# Patient Record
Sex: MALE | Race: WHITE | Employment: OTHER | URBAN - METROPOLITAN AREA
[De-identification: names, ages, dates, MRNs, and addresses within clinical notes are randomized per-mention and may not be internally consistent; named-entity substitution may affect disease eponyms.]

---

## 2017-01-31 ENCOUNTER — APPOINTMENT (OUTPATIENT)
Dept: GENERAL RADIOLOGY | Age: 77
End: 2017-01-31
Payer: MEDICARE

## 2017-01-31 ENCOUNTER — HOSPITAL ENCOUNTER (EMERGENCY)
Age: 77
Discharge: HOME | End: 2017-01-31
Attending: EMERGENCY MEDICINE
Payer: MEDICARE

## 2017-01-31 VITALS
HEIGHT: 72 IN | SYSTOLIC BLOOD PRESSURE: 150 MMHG | BODY MASS INDEX: 29.93 KG/M2 | TEMPERATURE: 97.6 F | RESPIRATION RATE: 18 BRPM | OXYGEN SATURATION: 96 % | HEART RATE: 78 BPM | WEIGHT: 221 LBS | DIASTOLIC BLOOD PRESSURE: 93 MMHG

## 2017-01-31 DIAGNOSIS — R53.1 WEAKNESS: ICD-10-CM

## 2017-01-31 DIAGNOSIS — R06.2 WHEEZING: Primary | ICD-10-CM

## 2017-01-31 LAB
ALBUMIN SERPL BCP-MCNC: 3.4 G/DL (ref 3.2–4.6)
ALBUMIN/GLOB SERPL: 0.9 {RATIO} (ref 1.2–3.5)
ALP SERPL-CCNC: 110 U/L (ref 50–136)
ALT SERPL-CCNC: 30 U/L (ref 12–65)
ANION GAP BLD CALC-SCNC: 9 MMOL/L (ref 7–16)
AST SERPL W P-5'-P-CCNC: 31 U/L (ref 15–37)
ATRIAL RATE: 87 BPM
BASOPHILS # BLD AUTO: 0 K/UL (ref 0–0.2)
BASOPHILS # BLD: 0 % (ref 0–2)
BILIRUB SERPL-MCNC: 0.6 MG/DL (ref 0.2–1.1)
BUN SERPL-MCNC: 17 MG/DL (ref 8–23)
CALCIUM SERPL-MCNC: 8.9 MG/DL (ref 8.3–10.4)
CALCULATED P AXIS, ECG09: 46 DEGREES
CALCULATED R AXIS, ECG10: -7 DEGREES
CALCULATED T AXIS, ECG11: 94 DEGREES
CHLORIDE SERPL-SCNC: 104 MMOL/L (ref 98–107)
CO2 SERPL-SCNC: 29 MMOL/L (ref 21–32)
CREAT SERPL-MCNC: 1.18 MG/DL (ref 0.8–1.5)
DIAGNOSIS, 93000: NORMAL
DIASTOLIC BP, ECG02: NORMAL MMHG
DIFFERENTIAL METHOD BLD: ABNORMAL
EOSINOPHIL # BLD: 0.2 K/UL (ref 0–0.8)
EOSINOPHIL NFR BLD: 2 % (ref 0.5–7.8)
ERYTHROCYTE [DISTWIDTH] IN BLOOD BY AUTOMATED COUNT: 14.2 % (ref 11.9–14.6)
GLOBULIN SER CALC-MCNC: 3.6 G/DL (ref 2.3–3.5)
GLUCOSE SERPL-MCNC: 131 MG/DL (ref 65–100)
HCT VFR BLD AUTO: 41.8 % (ref 41.1–50.3)
HGB BLD-MCNC: 13.9 G/DL (ref 13.6–17.2)
IMM GRANULOCYTES # BLD: 0 K/UL (ref 0–0.5)
IMM GRANULOCYTES NFR BLD AUTO: 0 % (ref 0–5)
LIPASE SERPL-CCNC: 119 U/L (ref 73–393)
LYMPHOCYTES # BLD AUTO: 22 % (ref 13–44)
LYMPHOCYTES # BLD: 2.5 K/UL (ref 0.5–4.6)
MCH RBC QN AUTO: 32.6 PG (ref 26.1–32.9)
MCHC RBC AUTO-ENTMCNC: 33.3 G/DL (ref 31.4–35)
MCV RBC AUTO: 97.9 FL (ref 79.6–97.8)
MONOCYTES # BLD: 1 K/UL (ref 0.1–1.3)
MONOCYTES NFR BLD AUTO: 9 % (ref 4–12)
NEUTS SEG # BLD: 7.8 K/UL (ref 1.7–8.2)
NEUTS SEG NFR BLD AUTO: 67 % (ref 43–78)
P-R INTERVAL, ECG05: 176 MS
PLATELET # BLD AUTO: 251 K/UL (ref 150–450)
PLATELET COMMENTS,PCOM: ADEQUATE
PMV BLD AUTO: 9.5 FL (ref 10.8–14.1)
POTASSIUM SERPL-SCNC: 3 MMOL/L (ref 3.5–5.1)
PROT SERPL-MCNC: 7 G/DL (ref 6.3–8.2)
Q-T INTERVAL, ECG07: 356 MS
QRS DURATION, ECG06: 80 MS
QTC CALCULATION (BEZET), ECG08: 428 MS
RBC # BLD AUTO: 4.27 M/UL (ref 4.23–5.67)
RBC MORPH BLD: ABNORMAL
SODIUM SERPL-SCNC: 142 MMOL/L (ref 136–145)
SYSTOLIC BP, ECG01: NORMAL MMHG
VENTRICULAR RATE, ECG03: 87 BPM
WBC # BLD AUTO: 11.5 K/UL (ref 4.3–11.1)
WBC MORPH BLD: ABNORMAL

## 2017-01-31 PROCEDURE — 77030013140 HC MSK NEB VYRM -A

## 2017-01-31 PROCEDURE — 74011250636 HC RX REV CODE- 250/636: Performed by: EMERGENCY MEDICINE

## 2017-01-31 PROCEDURE — 74011250637 HC RX REV CODE- 250/637: Performed by: EMERGENCY MEDICINE

## 2017-01-31 PROCEDURE — 83690 ASSAY OF LIPASE: CPT | Performed by: EMERGENCY MEDICINE

## 2017-01-31 PROCEDURE — 99284 EMERGENCY DEPT VISIT MOD MDM: CPT | Performed by: EMERGENCY MEDICINE

## 2017-01-31 PROCEDURE — 80053 COMPREHEN METABOLIC PANEL: CPT

## 2017-01-31 PROCEDURE — 74011000250 HC RX REV CODE- 250: Performed by: EMERGENCY MEDICINE

## 2017-01-31 PROCEDURE — 93005 ELECTROCARDIOGRAM TRACING: CPT

## 2017-01-31 PROCEDURE — 96374 THER/PROPH/DIAG INJ IV PUSH: CPT | Performed by: EMERGENCY MEDICINE

## 2017-01-31 PROCEDURE — 94640 AIRWAY INHALATION TREATMENT: CPT

## 2017-01-31 PROCEDURE — 85025 COMPLETE CBC W/AUTO DIFF WBC: CPT

## 2017-01-31 PROCEDURE — 71020 XR CHEST PA LAT: CPT

## 2017-01-31 RX ORDER — ALBUTEROL SULFATE 0.83 MG/ML
5 SOLUTION RESPIRATORY (INHALATION)
Status: COMPLETED | OUTPATIENT
Start: 2017-01-31 | End: 2017-01-31

## 2017-01-31 RX ORDER — SODIUM CHLORIDE 0.9 % (FLUSH) 0.9 %
5-10 SYRINGE (ML) INJECTION EVERY 8 HOURS
Status: DISCONTINUED | OUTPATIENT
Start: 2017-01-31 | End: 2017-01-31 | Stop reason: HOSPADM

## 2017-01-31 RX ORDER — PREDNISONE 50 MG/1
50 TABLET ORAL DAILY
Qty: 5 TAB | Refills: 0 | Status: SHIPPED | OUTPATIENT
Start: 2017-01-31 | End: 2017-02-05

## 2017-01-31 RX ORDER — POTASSIUM CHLORIDE 20 MEQ/1
40 TABLET, EXTENDED RELEASE ORAL
Status: COMPLETED | OUTPATIENT
Start: 2017-01-31 | End: 2017-01-31

## 2017-01-31 RX ORDER — ALBUTEROL SULFATE 0.83 MG/ML
2.5 SOLUTION RESPIRATORY (INHALATION)
Qty: 1 PACKAGE | Refills: 2 | Status: SHIPPED | OUTPATIENT
Start: 2017-01-31 | End: 2018-02-16

## 2017-01-31 RX ORDER — SODIUM CHLORIDE 0.9 % (FLUSH) 0.9 %
5-10 SYRINGE (ML) INJECTION AS NEEDED
Status: DISCONTINUED | OUTPATIENT
Start: 2017-01-31 | End: 2017-01-31 | Stop reason: HOSPADM

## 2017-01-31 RX ADMIN — POTASSIUM CHLORIDE 40 MEQ: 20 TABLET, EXTENDED RELEASE ORAL at 12:33

## 2017-01-31 RX ADMIN — ALBUTEROL SULFATE 5 MG: 2.5 SOLUTION RESPIRATORY (INHALATION) at 13:20

## 2017-01-31 RX ADMIN — METHYLPREDNISOLONE SODIUM SUCCINATE 125 MG: 125 INJECTION, POWDER, FOR SOLUTION INTRAMUSCULAR; INTRAVENOUS at 12:33

## 2017-01-31 NOTE — PROGRESS NOTES
Information faxed to [employer] for Nebulizer. They state that they will fedex the nebulizer to the patient.

## 2017-01-31 NOTE — ED PROVIDER NOTES
HPI Comments: Patient is a 59-year-old male who is coming in with a pain underneath both ribs particularly when he bends over. He has had a little bit of shortness of breath with it as well as some generalized weakness. Trinity James He states it's been there for about 3 weeks. Patient has had some wheezing and cough diffusely. Patient is a 67 y.o. male presenting with shortness of breath. The history is provided by the patient. Shortness of Breath   Pertinent negatives include no fever, no cough, no wheezing, no chest pain, no vomiting and no abdominal pain. Past Medical History:   Diagnosis Date    Arthritis      generalized osteo.  CAD (coronary artery disease)     Claustrophobia     Ill-defined condition      orthostatic hypotension    Sarcoidosis of lymph nodes (HCC)      in remission    Seizures (HCC)     Shortness of breath     Syncope and collapse     Vertigo      occassional       Past Surgical History:   Procedure Laterality Date    Hx lymph node dissection       Sarcoid in remission    Hx shoulder arthroscopy       right side     Hx knee arthroscopy       left knee    Hx lap cholecystectomy      Hx cataract removal Bilateral     Hx colonoscopy           Family History:   Problem Relation Age of Onset    Heart Disease Mother     Cancer Father     Stroke Brother     Coronary Artery Disease Other      Family History       Social History     Social History    Marital status:      Spouse name: N/A    Number of children: N/A    Years of education: N/A     Occupational History    Not on file. Social History Main Topics    Smoking status: Former Smoker    Smokeless tobacco: Former User      Comment: quit over 20 years ago    Alcohol use No    Drug use: No    Sexual activity: Not on file     Other Topics Concern    Not on file     Social History Narrative         ALLERGIES: Bactrim [sulfamethoprim ds]; Keppra [levetiracetam];  Levaquin [levofloxacin]; and Other medication    Review of Systems   Constitutional: Negative for chills and fever. Respiratory: Positive for shortness of breath. Negative for cough, chest tightness, wheezing and stridor. Cardiovascular: Negative for chest pain and palpitations. Gastrointestinal: Negative for abdominal pain, nausea and vomiting. Skin: Negative. All other systems reviewed and are negative. Vitals:    01/31/17 1125   BP: 128/83   Pulse: 86   Resp: 18   Temp: 97.5 °F (36.4 °C)   SpO2: 94%   Weight: 100.2 kg (221 lb)   Height: 6' (1.829 m)            Physical Exam   Constitutional: He is oriented to person, place, and time. He appears well-developed and well-nourished. No distress. HENT:   Head: Normocephalic and atraumatic. Eyes: Conjunctivae are normal. No scleral icterus. Neck: Normal range of motion. Neck supple. Cardiovascular: Normal rate, regular rhythm and normal heart sounds. Pulmonary/Chest: Effort normal. No stridor. No respiratory distress. He has wheezes (bilateral lung wheezing). He has no rales. He exhibits no tenderness. Abdominal: Soft. There is tenderness (mild tenderness in the epigastric region diffusely. ). There is no rebound and no guarding. Neurological: He is alert and oriented to person, place, and time. No focal weakness   Skin: Skin is warm and dry. No rash noted. He is not diaphoretic. Psychiatric: He has a normal mood and affect. His behavior is normal.   Nursing note and vitals reviewed. MDM  Number of Diagnoses or Management Options  Weakness:   Wheezing:   Diagnosis management comments: Patient's breathing has improved following treatments. His family states he has a hard time taking his inhaler at home even when using a spacer I have spoken with  and we are trying to arrange for a home nebulizer.   I will also put him on steroid prescription of prednisone burst.    Dung Bruce MD; 1/31/2017 @1:54 PM Voice dictation software was used during the making of this note. This software is not perfect and grammatical and other typographical errors may be present.   This note has not been proofread for errors.  ===================================================================        Amount and/or Complexity of Data Reviewed  Clinical lab tests: ordered and reviewed (Results for orders placed or performed during the hospital encounter of 01/31/17  -CBC WITH AUTOMATED DIFF       Result                                            Value                         Ref Range                       WBC                                               11.5 (H)                      4.3 - 11.1 K/uL                 RBC                                               4.27                          4.23 - 5.67 M/uL                HGB                                               13.9                          13.6 - 17.2 g/dL                HCT                                               41.8                          41.1 - 50.3 %                   MCV                                               97.9 (H)                      79.6 - 97.8 FL                  MCH                                               32.6                          26.1 - 32.9 PG                  MCHC                                              33.3                          31.4 - 35.0 g/dL                RDW                                               14.2                          11.9 - 14.6 %                   PLATELET                                          251                           150 - 450 K/uL                  MPV                                               9.5 (L)                       10.8 - 14.1 FL                  NEUTROPHILS                                       67                            43 - 78 %                       LYMPHOCYTES                                       22                            13 - 44 %                       MONOCYTES                                         9 4.0 - 12.0 %                    EOSINOPHILS                                       2                             0.5 - 7.8 %                     BASOPHILS                                         0                             0.0 - 2.0 %                     IMMATURE GRANULOCYTES                             0                             0.0 - 5.0 %                     ABS. NEUTROPHILS                                  7.8                           1.7 - 8.2 K/UL                  ABS. LYMPHOCYTES                                  2.5                           0.5 - 4.6 K/UL                  ABS. MONOCYTES                                    1.0                           0.1 - 1.3 K/UL                  ABS. EOSINOPHILS                                  0.2                           0.0 - 0.8 K/UL                  ABS. BASOPHILS                                    0.0                           0.0 - 0.2 K/UL                  ABS. IMM.  GRANS.                                  0.0                           0.0 - 0.5 K/UL                  RBC COMMENTS                                      NORMOCYTIC/NORMOCHROMIC                                       WBC COMMENTS                                      Result Confirmed By Smear                                     PLATELET COMMENTS                                 ADEQUATE                                                      DF                                                AUTOMATED                                                -METABOLIC PANEL, COMPREHENSIVE       Result                                            Value                         Ref Range                       Sodium                                            142                           136 - 145 mmol/L                Potassium                                         3.0 (L)                       3.5 - 5.1 mmol/L                Chloride                                          104 98 - 107 mmol/L                 CO2                                               29                            21 - 32 mmol/L                  Anion gap                                         9                             7 - 16 mmol/L                   Glucose                                           131 (H)                       65 - 100 mg/dL                  BUN                                               17                            8 - 23 MG/DL                    Creatinine                                        1.18                          0.8 - 1.5 MG/DL                 GFR est AA                                        >60                           >60 ml/min/1.73m2               GFR est non-AA                                    >60                           >60 ml/min/1.73m2               Calcium                                           8.9                           8.3 - 10.4 MG/DL                Bilirubin, total                                  0.6                           0.2 - 1.1 MG/DL                 ALT                                               30                            12 - 65 U/L                     AST                                               31                            15 - 37 U/L                     Alk. phosphatase                                  110                           50 - 136 U/L                    Protein, total                                    7.0                           6.3 - 8.2 g/dL                  Albumin                                           3.4                           3.2 - 4.6 g/dL                  Globulin                                          3.6 (H)                       2.3 - 3.5 g/dL                  A-G Ratio                                         0.9 (L)                       1.2 - 3.5                  -LIPASE       Result                                            Value                         Ref Range Lipase                                            119                           73 - 393 U/L               -EKG, 12 LEAD, INITIAL       Result                                            Value                         Ref Range                       Systolic BP                                                                     mmHg                            Diastolic BP                                                                    mmHg                            Ventricular Rate                                  87                            BPM                             Atrial Rate                                       87                            BPM                             P-R Interval                                      176                           ms                              QRS Duration                                      80                            ms                              Q-T Interval                                      356                           ms                              QTC Calculation (Bezet)                           428                           ms                              Calculated P Axis                                 46                            degrees                         Calculated R Axis                                 -7                            degrees                         Calculated T Axis                                 94                            degrees                         Diagnosis                                                                                                   !!! Poor data quality, interpretation may be adversely affected   Sinus rhythm with Premature atrial complexes   Nonspecific T wave abnormality   Abnormal ECG   When compared with ECG of 16-JUN-2016 08:43,   Premature atrial complexes are now Present   QRS duration has decreased   T wave inversion more evident in Lateral leads    )  Tests in the radiology section of CPT®: ordered and reviewed (Xr Chest Pa Lat    Result Date: 1/31/2017  Chest 2 views dated 1/31/2017 CLINICAL INFORMATION: Shortness of breath. History of sarcoidosis Heart is mildly enlarged and mediastinum unremarkable. Pulmonary vascularity appears normal and lungs are clear. No pleural effusion.      IMPRESSION: No acute abnormality   )      ED Course       Procedures

## 2017-01-31 NOTE — DISCHARGE INSTRUCTIONS
Wheezing or Bronchoconstriction: Care Instructions  Your Care Instructions  Wheezing is a whistling noise made during breathing. It occurs when the small airways, or bronchial tubes, that lead to your lungs swell or contract (spasm) and become narrow. This narrowing is called bronchoconstriction. When your airways constrict, it is hard for air to pass through and this makes it hard for you to breathe. Wheezing and bronchoconstriction can be caused by many problems, including:  · An infection such as the flu or a cold. · Allergies such as hay fever. · Diseases such as asthma or chronic obstructive pulmonary disease. · Smoking. Treatment for your wheezing depends on what is causing the problem. Your wheezing may get better without treatment. But you may need to pay attention to things that cause your wheezing and avoid them. Or you may need medicine to help treat the wheezing and to reduce the swelling or to relieve spasms in your lungs. Follow-up care is a key part of your treatment and safety. Be sure to make and go to all appointments, and call your doctor if you are having problems. It is also a good idea to know your test results and keep a list of the medicines you take. How can you care for yourself at home? · Take your medicine exactly as prescribed. Call your doctor if you think you are having a problem with your medicine. You will get more details on the specific medicine your doctor prescribes. · If your doctor prescribed antibiotics, take them as directed. Do not stop taking them just because you feel better. You need to take the full course of antibiotics. · Breathe moist air from a humidifier, hot shower, or sink filled with hot water. This may help ease your symptoms and make it easier for you to breathe. · If you have congestion in your nose and throat, drinking plenty of fluids, especially hot fluids, may help relieve your symptoms.  If you have kidney, heart, or liver disease and have to limit fluids, talk with your doctor before you increase the amount of fluids you drink. · If you have mucus in your airways, it may help to breathe deeply and cough. · Do not smoke or allow others to smoke around you. Smoking can make your wheezing worse. If you need help quitting, talk to your doctor about stop-smoking programs and medicines. These can increase your chances of quitting for good. · Avoid things that may cause your wheezing. These may include colds, smoke, air pollution, dust, pollen, pets, cockroaches, stress, and cold air. When should you call for help? Call 911 anytime you think you may need emergency care. For example, call if:  · You have severe trouble breathing. · You passed out (lost consciousness). Call your doctor now or seek immediate medical care if:  · You cough up yellow, dark brown, or bloody mucus (sputum). · You have new or worse shortness of breath. · Your wheezing is not getting better or it gets worse after you start taking your medicine. Watch closely for changes in your health, and be sure to contact your doctor if:  · You do not get better as expected. Where can you learn more? Go to http://nicol-shanae.info/. Enter 454 [id number] in the search box to learn more about \"Wheezing or Bronchoconstriction: Care Instructions. \"  Current as of: May 23, 2016  Content Version: 11.1  © 5961-1511 [employer]. Care instructions adapted under license by [employer] (which disclaims liability or warranty for this information). If you have questions about a medical condition or this instruction, always ask your healthcare professional. [place] [address] any warranty or liability for your use of this information. Weakness: Care Instructions  Your Care Instructions  Weakness is a lack of physical or muscle strength. You may feel that you need to make extra effort to move your arms, legs, or other muscles.  Generalized weakness means that you feel weak in most areas of your body. Another type of weakness may affect just one muscle or group of muscles. You may feel weak and tired after you have done too much activity, such as taking an extra-long hike. This is not a serious problem. It often goes away on its own. Feeling weak can also be caused by medical conditions like thyroid problems, depression, or a virus. Sometimes the cause can be serious. Your doctor may want to do more tests to try to find the cause of the weakness. The doctor has checked you carefully, but problems can develop later. If you notice any problems or new symptoms, get medical treatment right away. Follow-up care is a key part of your treatment and safety. Be sure to make and go to all appointments, and call your doctor if you are having problems. It's also a good idea to know your test results and keep a list of the medicines you take. How can you care for yourself at home? · Rest when you feel tired. · Be safe with medicines. If your doctor prescribed medicine, take it exactly as prescribed. Call your doctor if you think you are having a problem with your medicine. You will get more details on the specific medicines your doctor prescribes. · Do not skip meals. Eating a balanced diet may increase your energy level. · Get some physical activity every day, but do not get too tired. When should you call for help? Call your doctor now or seek immediate medical care if:  · You have new or worse weakness. · You are dizzy or lightheaded, or you feel like you may faint. Watch closely for changes in your health, and be sure to contact your doctor if:  · You do not get better as expected. Where can you learn more? Go to http://nicol-shanae.info/. Enter 606 [id number] [id number] in the search box to learn more about \"Weakness: Care Instructions. \"  Current as of: May 27, 2016  Content Version: 11.1  © 9081-5541 [employer].  Care instructions adapted under license by [employer] (which disclaims liability or warranty for this information). If you have questions about a medical condition or this instruction, always ask your healthcare professional. [address] any warranty or liability for your use of this information.

## 2017-08-16 PROBLEM — M54.50 CHRONIC LOW BACK PAIN: Status: ACTIVE | Noted: 2017-08-16

## 2017-08-16 PROBLEM — G89.29 CHRONIC LOW BACK PAIN: Status: ACTIVE | Noted: 2017-08-16

## 2017-08-16 PROBLEM — Z91.09 ENVIRONMENTAL ALLERGIES: Status: ACTIVE | Noted: 2017-08-16

## 2017-10-20 PROBLEM — Z95.820 S/P ANGIOPLASTY WITH STENT: Status: ACTIVE | Noted: 2017-10-20

## 2017-10-20 PROBLEM — I35.8 AORTIC VALVE SCLEROSIS: Status: ACTIVE | Noted: 2017-10-20

## 2018-10-01 ENCOUNTER — HOSPITAL ENCOUNTER (EMERGENCY)
Age: 78
Discharge: HOME | End: 2018-10-02
Attending: EMERGENCY MEDICINE
Payer: MEDICARE

## 2018-10-01 DIAGNOSIS — R19.7 DIARRHEA, UNSPECIFIED TYPE: Primary | ICD-10-CM

## 2018-10-01 DIAGNOSIS — R10.84 ABDOMINAL PAIN, GENERALIZED: ICD-10-CM

## 2018-10-01 DIAGNOSIS — R63.4 WEIGHT LOSS: ICD-10-CM

## 2018-10-01 LAB
ALBUMIN SERPL-MCNC: 3.8 G/DL (ref 3.2–4.6)
ALBUMIN/GLOB SERPL: 1.2 {RATIO} (ref 1.2–3.5)
ALP SERPL-CCNC: 116 U/L (ref 50–136)
ALT SERPL-CCNC: 18 U/L (ref 12–65)
ANION GAP SERPL CALC-SCNC: 7 MMOL/L (ref 7–16)
AST SERPL-CCNC: 23 U/L (ref 15–37)
BASOPHILS # BLD: 0 K/UL (ref 0–0.2)
BASOPHILS NFR BLD: 0 % (ref 0–2)
BILIRUB SERPL-MCNC: 0.5 MG/DL (ref 0.2–1.1)
BUN SERPL-MCNC: 17 MG/DL (ref 8–23)
CALCIUM SERPL-MCNC: 9.2 MG/DL (ref 8.3–10.4)
CHLORIDE SERPL-SCNC: 105 MMOL/L (ref 98–107)
CO2 SERPL-SCNC: 31 MMOL/L (ref 21–32)
CREAT SERPL-MCNC: 0.98 MG/DL (ref 0.8–1.5)
DIFFERENTIAL METHOD BLD: ABNORMAL
EOSINOPHIL # BLD: 0.1 K/UL (ref 0–0.8)
EOSINOPHIL NFR BLD: 2 % (ref 0.5–7.8)
ERYTHROCYTE [DISTWIDTH] IN BLOOD BY AUTOMATED COUNT: 13.3 %
GLOBULIN SER CALC-MCNC: 3.3 G/DL (ref 2.3–3.5)
GLUCOSE SERPL-MCNC: 104 MG/DL (ref 65–100)
HCT VFR BLD AUTO: 41.6 % (ref 41.1–50.3)
HGB BLD-MCNC: 13.7 G/DL (ref 13.6–17.2)
IMM GRANULOCYTES # BLD: 0 K/UL (ref 0–0.5)
IMM GRANULOCYTES NFR BLD AUTO: 0 % (ref 0–5)
LIPASE SERPL-CCNC: 138 U/L (ref 73–393)
LYMPHOCYTES # BLD: 1.9 K/UL (ref 0.5–4.6)
LYMPHOCYTES NFR BLD: 24 % (ref 13–44)
MCH RBC QN AUTO: 32.6 PG (ref 26.1–32.9)
MCHC RBC AUTO-ENTMCNC: 32.9 G/DL (ref 31.4–35)
MCV RBC AUTO: 99 FL (ref 79.6–97.8)
MONOCYTES # BLD: 0.9 K/UL (ref 0.1–1.3)
MONOCYTES NFR BLD: 11 % (ref 4–12)
NEUTS SEG # BLD: 5.2 K/UL (ref 1.7–8.2)
NEUTS SEG NFR BLD: 64 % (ref 43–78)
NRBC # BLD: 0 K/UL (ref 0–0.2)
PLATELET # BLD AUTO: 218 K/UL (ref 150–450)
PMV BLD AUTO: 10 FL (ref 9.4–12.3)
POTASSIUM SERPL-SCNC: 3.2 MMOL/L (ref 3.5–5.1)
PROT SERPL-MCNC: 7.1 G/DL (ref 6.3–8.2)
RBC # BLD AUTO: 4.2 M/UL (ref 4.23–5.6)
SODIUM SERPL-SCNC: 143 MMOL/L (ref 136–145)
WBC # BLD AUTO: 8.1 K/UL (ref 4.3–11.1)

## 2018-10-01 PROCEDURE — 83690 ASSAY OF LIPASE: CPT

## 2018-10-01 PROCEDURE — 99284 EMERGENCY DEPT VISIT MOD MDM: CPT | Performed by: EMERGENCY MEDICINE

## 2018-10-01 PROCEDURE — 85025 COMPLETE CBC W/AUTO DIFF WBC: CPT

## 2018-10-01 PROCEDURE — 80053 COMPREHEN METABOLIC PANEL: CPT

## 2018-10-01 RX ORDER — POTASSIUM CHLORIDE 20 MEQ/1
40 TABLET, EXTENDED RELEASE ORAL
Status: COMPLETED | OUTPATIENT
Start: 2018-10-01 | End: 2018-10-02

## 2018-10-02 ENCOUNTER — APPOINTMENT (OUTPATIENT)
Dept: CT IMAGING | Age: 78
End: 2018-10-02
Attending: EMERGENCY MEDICINE
Payer: MEDICARE

## 2018-10-02 VITALS
TEMPERATURE: 97.7 F | RESPIRATION RATE: 18 BRPM | BODY MASS INDEX: 23.03 KG/M2 | SYSTOLIC BLOOD PRESSURE: 135 MMHG | HEART RATE: 70 BPM | DIASTOLIC BLOOD PRESSURE: 85 MMHG | OXYGEN SATURATION: 96 % | WEIGHT: 170 LBS | HEIGHT: 72 IN

## 2018-10-02 LAB
BACTERIA URNS QL MICRO: 0 /HPF
CASTS URNS QL MICRO: NORMAL /LPF
EPI CELLS #/AREA URNS HPF: NORMAL /HPF
RBC #/AREA URNS HPF: NORMAL /HPF
WBC URNS QL MICRO: NORMAL /HPF

## 2018-10-02 PROCEDURE — 74011250637 HC RX REV CODE- 250/637: Performed by: EMERGENCY MEDICINE

## 2018-10-02 PROCEDURE — 74011636320 HC RX REV CODE- 636/320: Performed by: EMERGENCY MEDICINE

## 2018-10-02 PROCEDURE — 74177 CT ABD & PELVIS W/CONTRAST: CPT

## 2018-10-02 PROCEDURE — 81003 URINALYSIS AUTO W/O SCOPE: CPT | Performed by: EMERGENCY MEDICINE

## 2018-10-02 PROCEDURE — 74011000258 HC RX REV CODE- 258: Performed by: EMERGENCY MEDICINE

## 2018-10-02 PROCEDURE — 81015 MICROSCOPIC EXAM OF URINE: CPT

## 2018-10-02 RX ORDER — LOPERAMIDE HYDROCHLORIDE 2 MG/1
2 CAPSULE ORAL
Qty: 15 CAP | Refills: 0 | Status: SHIPPED | OUTPATIENT
Start: 2018-10-02 | End: 2018-10-12

## 2018-10-02 RX ORDER — SODIUM CHLORIDE 0.9 % (FLUSH) 0.9 %
10 SYRINGE (ML) INJECTION
Status: COMPLETED | OUTPATIENT
Start: 2018-10-02 | End: 2018-10-02

## 2018-10-02 RX ADMIN — IOPAMIDOL 100 ML: 755 INJECTION, SOLUTION INTRAVENOUS at 01:39

## 2018-10-02 RX ADMIN — Medication 10 ML: at 01:39

## 2018-10-02 RX ADMIN — POTASSIUM CHLORIDE 40 MEQ: 20 TABLET, EXTENDED RELEASE ORAL at 00:27

## 2018-10-02 RX ADMIN — SODIUM CHLORIDE 100 ML: 900 INJECTION, SOLUTION INTRAVENOUS at 01:39

## 2018-10-02 RX ADMIN — DIATRIZOATE MEGLUMINE AND DIATRIZOATE SODIUM 15 ML: 600; 100 SOLUTION ORAL; RECTAL at 00:24

## 2018-10-02 NOTE — ED TRIAGE NOTES
Pt came to the ed c/o nausea, vomitting, and diarrhea x6-8 months. Pt has a gi doc apt, and scheduled for EGD for the 30th of October. Pt is taking carafate until this. pts daughter concerned for dehydration and weakness. Denies cp or sob at this time.

## 2018-10-02 NOTE — ED PROVIDER NOTES
HPI Comments: 76 yo male who had a large bowel movement today that scared him. He has been having a lot of diarrhea for several months now. No blood in it. He does have some mild lower abdominal pain. No fevers or chills. His daughter is concerned about him becoming dehydrated. He has had cholecystectomy several months ago. Family also report a 30 pound weight loss over the last 6 months. He has been seeing his PCP for this. He has had colonoscopy within the last year and is scheduled for an EGD on October 30. Patient is a 77 y.o. male presenting with lethargy. The history is provided by the patient. Lethargy Associated symptoms include abdominal pain. Pertinent negatives include no shortness of breath. Past Medical History:  
Diagnosis Date  Arthritis   
 generalized osteo.  CAD (coronary artery disease)  Claustrophobia  Ill-defined condition   
 orthostatic hypotension  Sarcoidosis of lymph nodes   
 in remission  Seizures ([address]  Shortness of breath  Syncope and collapse  Vertigo   
 occassional  
 
 
Past Surgical History:  
Procedure Laterality Date  HX CATARACT REMOVAL Bilateral   
 HX COLONOSCOPY    
 HX KNEE ARTHROSCOPY    
 left knee  HX LAP CHOLECYSTECTOMY  HX LYMPH NODE DISSECTION Sarcoid in remission  HX SHOULDER ARTHROSCOPY    
 right side  HX UROLOGICAL  2015 TURP Family History:  
Problem Relation Age of Onset  Heart Disease Mother  Cancer Father  Stroke Brother  Coronary Artery Disease Other Family History Social History Social History  Marital status:  Spouse name: N/A  
 Number of children: N/A  
 Years of education: N/A Occupational History  Not on file. Social History Main Topics  Smoking status: Former Smoker  Smokeless tobacco: Former User Comment: quit over 20 years ago  Alcohol use No  
 Drug use:  No  
  Sexual activity: Not on file Other Topics Concern  Not on file Social History Narrative ALLERGIES: Bactrim [sulfamethoprim ds]; Keppra [levetiracetam]; Levaquin [levofloxacin]; and Other medication Review of Systems Constitutional: Negative for chills and fever. Respiratory: Negative for chest tightness, shortness of breath, wheezing and stridor. Gastrointestinal: Positive for abdominal pain, diarrhea, nausea and vomiting. Musculoskeletal: Negative for neck pain and neck stiffness. Skin: Negative. All other systems reviewed and are negative. Vitals:  
 10/01/18 2058 BP: (!) 145/91 Pulse: 70 Resp: 18 Temp: 97.7 °F (36.5 °C) SpO2: 95% Weight: 77.1 kg (170 lb) Height: 6' (1.829 m) Physical Exam  
Constitutional: He is oriented to person, place, and time. He appears well-developed and well-nourished. No distress. HENT:  
Head: Normocephalic and atraumatic. Eyes: Conjunctivae are normal. Right eye exhibits no discharge. Left eye exhibits no discharge. Neck: Neck supple. Pulmonary/Chest: Effort normal. No stridor. No respiratory distress. Abdominal: Soft. Bowel sounds are normal. He exhibits no distension and no mass. There is no tenderness. There is no rebound and no guarding. Neurological: He is alert and oriented to person, place, and time. No focal weakness speech normal  
Skin: Skin is warm and dry. Psychiatric: He has a normal mood and affect. His behavior is normal.  
Nursing note and vitals reviewed. MDM Number of Diagnoses or Management Options Diagnosis management comments: Patient has benign abdomen. He has not had his abdomen imaged in with the weight loss and discomfort had discussed doing this and family wished to proceed with this. To rule out any  Tumors or other pathology. Disposition will depend on this and patient will likely be discharged to follow up with GI. Steve Murdock MD; 10/2/2018 @12:03 AM Voice dictation software was used during the making of this note. This software is not perfect and grammatical and other typographical errors may be present. This note has not been proofread for errors. 
=================================================================== Amount and/or Complexity of Data Reviewed Clinical lab tests: ordered and reviewed (Results for orders placed or performed during the hospital encounter of 10/01/18 
-CBC WITH AUTOMATED DIFF Result                                            Value                         Ref Range WBC                                               8.1                           4.3 - 11.1 K/uL           
     RBC                                               4.20 (L)                      4.23 - 5.6 M/uL HGB                                               13.7                          13.6 - 17.2 g/dL HCT                                               41.6                          41.1 - 50.3 % MCV                                               99.0 (H)                      79.6 - 97.8 FL            
     MCH                                               32.6                          26.1 - 32.9 PG            
     MCHC                                              32.9                          31.4 - 35.0 g/dL RDW                                               13.3                          % PLATELET                                          218                           150 - 450 K/uL MPV                                               10.0                          9.4 - 12.3 FL      ABSOLUTE NRBC                                     0.00                          0.0 - 0.2 K/uL            
     DF                                                AUTOMATED NEUTROPHILS                                       64                            43 - 78 % LYMPHOCYTES                                       24                            13 - 44 % MONOCYTES                                         11                            4.0 - 12.0 % EOSINOPHILS                                       2                             0.5 - 7.8 % BASOPHILS                                         0                             0.0 - 2.0 % IMMATURE GRANULOCYTES                             0                             0.0 - 5.0 %               
     ABS. NEUTROPHILS                                  5.2                           1.7 - 8.2 K/UL            
     ABS. LYMPHOCYTES                                  1.9                           0.5 - 4.6 K/UL            
     ABS. MONOCYTES                                    0.9                           0.1 - 1.3 K/UL            
     ABS. EOSINOPHILS                                  0.1                           0.0 - 0.8 K/UL            
     ABS. BASOPHILS                                    0.0                           0.0 - 0.2 K/UL            
     ABS. IMM. GRANS.                                  0.0                           0.0 - 0.5 K/UL            
-METABOLIC PANEL, COMPREHENSIVE Result                                            Value                         Ref Range Sodium                                            143                           136 - 145 mmol/L Potassium                                         3.2 (L)                       3.5 - 5.1 mmol/L      Chloride                                          105                           98 - 107 mmol/L           
     CO2                                               31 21 - 32 mmol/L Anion gap                                         7                             7 - 16 mmol/L Glucose                                           104 (H)                       65 - 100 mg/dL BUN                                               17                            8 - 23 MG/DL Creatinine                                        0.98                          0.8 - 1.5 MG/DL           
     GFR est AA                                        >60                           >60 ml/min/1.73m2 GFR est non-AA                                    >60                           >60 ml/min/1.73m2 Calcium                                           9.2                           8.3 - 10.4 MG/DL Bilirubin, total                                  0.5                           0.2 - 1.1 MG/DL           
     ALT (SGPT)                                        18                            12 - 65 U/L               
     AST (SGOT)                                        23                            15 - 37 U/L Alk. phosphatase                                  116                           50 - 136 U/L Protein, total                                    7.1                           6.3 - 8.2 g/dL Albumin                                           3.8                           3.2 - 4.6 g/dL Globulin                                          3.3                           2.3 - 3.5 g/dL A-G Ratio                                         1.2                           1.2 - 3.5                 
-LIPASE Result                                            Value                         Ref Range      Lipase                                            138 73 - 393 U/L              ) 
 
 
 
 
ED Course Procedures

## 2018-10-02 NOTE — DISCHARGE INSTRUCTIONS
Diarrhea: Care Instructions  Your Care Instructions    Diarrhea is loose, watery stools (bowel movements). The exact cause is often hard to find. Sometimes diarrhea is your body's way of getting rid of what caused an upset stomach. Viruses, food poisoning, and many medicines can cause diarrhea. Some people get diarrhea in response to emotional stress, anxiety, or certain foods. Almost everyone has diarrhea now and then. It usually isn't serious, and your stools will return to normal soon. The important thing to do is replace the fluids you have lost, so you can prevent dehydration. The doctor has checked you carefully, but problems can develop later. If you notice any problems or new symptoms, get medical treatment right away. Follow-up care is a key part of your treatment and safety. Be sure to make and go to all appointments, and call your doctor if you are having problems. It's also a good idea to know your test results and keep a list of the medicines you take. How can you care for yourself at home? · Watch for signs of dehydration, which means your body has lost too much water. Dehydration is a serious condition and should be treated right away. Signs of dehydration are:  ¨ Increasing thirst and dry eyes and mouth. ¨ Feeling faint or lightheaded. ¨ Darker urine, and a smaller amount of urine than normal.  · To prevent dehydration, drink plenty of fluids, enough so that your urine is light yellow or clear like water. Choose water and other caffeine-free clear liquids until you feel better. If you have kidney, heart, or liver disease and have to limit fluids, talk with your doctor before you increase the amount of fluids you drink. · Begin eating small amounts of mild foods the next day, if you feel like it. ¨ Try yogurt that has live cultures of Lactobacillus. (Check the label.)  ¨ Avoid spicy foods, fruits, alcohol, and caffeine until 48 hours after all symptoms are gone.   ¨ Avoid chewing gum that contains sorbitol. ¨ Avoid dairy products (except for yogurt with Lactobacillus) while you have diarrhea and for 3 days after symptoms are gone. · The doctor may recommend that you take over-the-counter medicine, such as loperamide (Imodium), if you still have diarrhea after 6 hours. Read and follow all instructions on the label. Do not use this medicine if you have bloody diarrhea, a high fever, or other signs of serious illness. Call your doctor if you think you are having a problem with your medicine. When should you call for help? Call 911 anytime you think you may need emergency care. For example, call if:    · You passed out (lost consciousness).     · Your stools are maroon or very bloody.    Call your doctor now or seek immediate medical care if:    · You are dizzy or lightheaded, or you feel like you may faint.     · Your stools are black and look like tar, or they have streaks of blood.     · You have new or worse belly pain.     · You have symptoms of dehydration, such as:  ¨ Dry eyes and a dry mouth. ¨ Passing only a little dark urine. ¨ Feeling thirstier than usual.     · You have a new or higher fever.    Watch closely for changes in your health, and be sure to contact your doctor if:    · Your diarrhea is getting worse.     · You see pus in the diarrhea.     · You are not getting better after 2 days (48 hours). Where can you learn more? Go to http://nicol-shanae.info/. Enter R572 in the search box to learn more about \"Diarrhea: Care Instructions. \"  Current as of: November 20, 2017  Content Version: 11.7  © 4773-6576 [employer]. Care instructions adapted under license by [employer] (which disclaims liability or warranty for this information).  If you have questions about a medical condition or this instruction, always ask your healthcare professional. [place] [address] any warranty or liability for your use of this information. Abdominal Pain: Care Instructions  Your Care Instructions    Abdominal pain has many possible causes. Some aren't serious and get better on their own in a few days. Others need more testing and treatment. If your pain continues or gets worse, you need to be rechecked and may need more tests to find out what is wrong. You may need surgery to correct the problem. Don't ignore new symptoms, such as fever, nausea and vomiting, urination problems, pain that gets worse, and dizziness. These may be signs of a more serious problem. Your doctor may have recommended a follow-up visit in the next 8 to 12 hours. If you are not getting better, you may need more tests or treatment. The doctor has checked you carefully, but problems can develop later. If you notice any problems or new symptoms, get medical treatment right away. Follow-up care is a key part of your treatment and safety. Be sure to make and go to all appointments, and call your doctor if you are having problems. It's also a good idea to know your test results and keep a list of the medicines you take. How can you care for yourself at home? · Rest until you feel better. · To prevent dehydration, drink plenty of fluids, enough so that your urine is light yellow or clear like water. Choose water and other caffeine-free clear liquids until you feel better. If you have kidney, heart, or liver disease and have to limit fluids, talk with your doctor before you increase the amount of fluids you drink. · If your stomach is upset, eat mild foods, such as rice, dry toast or crackers, bananas, and applesauce. Try eating several small meals instead of two or three large ones. · Wait until 48 hours after all symptoms have gone away before you have spicy foods, alcohol, and drinks that contain caffeine. · Do not eat foods that are high in fat. · Avoid anti-inflammatory medicines such as aspirin, ibuprofen (Advil, Motrin), and naproxen (Aleve).  These can cause stomach upset. Talk to your doctor if you take daily aspirin for another health problem. When should you call for help? Call 911 anytime you think you may need emergency care. For example, call if:    · You passed out (lost consciousness).     · You pass maroon or very bloody stools.     · You vomit blood or what looks like coffee grounds.     · You have new, severe belly pain.    Call your doctor now or seek immediate medical care if:    · Your pain gets worse, especially if it becomes focused in one area of your belly.     · You have a new or higher fever.     · Your stools are black and look like tar, or they have streaks of blood.     · You have unexpected vaginal bleeding.     · You have symptoms of a urinary tract infection. These may include:  ¨ Pain when you urinate. ¨ Urinating more often than usual.  ¨ Blood in your urine.     · You are dizzy or lightheaded, or you feel like you may faint.    Watch closely for changes in your health, and be sure to contact your doctor if:    · You are not getting better after 1 day (24 hours). Where can you learn more? Go to http://nicol-shanae.info/. Enter B474 in the search box to learn more about \"Abdominal Pain: Care Instructions. \"  Current as of: November 20, 2017  Content Version: 11.7  © 8678-1778 [employer]. Care instructions adapted under license by [employer] (which disclaims liability or warranty for this information). If you have questions about a medical condition or this instruction, always ask your healthcare professional. [place] [address] any warranty or liability for your use of this information.

## 2018-10-02 NOTE — ED NOTES
I have reviewed discharge instructions with the patient. The patient verbalized understanding. Patient left ED via Discharge Method: wheelchair to Home with family daughter Opportunity for questions and clarification provided. Patient given 1 scripts. Medication explained to pt and pt v\u about medication. Pt in no acute distress at discharge To continue your aftercare when you leave the hospital, you may receive an automated call from our care team to check in on how you are doing. This is a free service and part of our promise to provide the best care and service to meet your aftercare needs.  If you have questions, or wish to unsubscribe from this service please call [phone]. Thank you for Choosing our [facility].

## 2018-10-29 ENCOUNTER — ANESTHESIA EVENT (OUTPATIENT)
Dept: ENDOSCOPY | Age: 78
End: 2018-10-29
Payer: MEDICARE

## 2018-10-29 RX ORDER — SODIUM CHLORIDE 0.9 % (FLUSH) 0.9 %
5-10 SYRINGE (ML) INJECTION AS NEEDED
Status: CANCELLED | OUTPATIENT
Start: 2018-10-29

## 2018-10-29 RX ORDER — SODIUM CHLORIDE, SODIUM LACTATE, POTASSIUM CHLORIDE, CALCIUM CHLORIDE 600; 310; 30; 20 MG/100ML; MG/100ML; MG/100ML; MG/100ML
100 INJECTION, SOLUTION INTRAVENOUS CONTINUOUS
Status: CANCELLED | OUTPATIENT
Start: 2018-10-29

## 2018-10-30 ENCOUNTER — HOSPITAL ENCOUNTER (OUTPATIENT)
Age: 78
Setting detail: OUTPATIENT SURGERY
Discharge: HOME | End: 2018-10-30
Attending: INTERNAL MEDICINE | Admitting: INTERNAL MEDICINE
Payer: MEDICARE

## 2018-10-30 ENCOUNTER — HOSPITAL ENCOUNTER (OUTPATIENT)
Dept: GENERAL RADIOLOGY | Age: 78
Discharge: HOME | End: 2018-10-30
Attending: INTERNAL MEDICINE
Payer: MEDICARE

## 2018-10-30 ENCOUNTER — ANESTHESIA (OUTPATIENT)
Dept: ENDOSCOPY | Age: 78
End: 2018-10-30
Payer: MEDICARE

## 2018-10-30 VITALS
RESPIRATION RATE: 18 BRPM | HEIGHT: 71 IN | HEART RATE: 66 BPM | OXYGEN SATURATION: 97 % | TEMPERATURE: 98 F | WEIGHT: 175 LBS | DIASTOLIC BLOOD PRESSURE: 85 MMHG | SYSTOLIC BLOOD PRESSURE: 139 MMHG | BODY MASS INDEX: 24.5 KG/M2

## 2018-10-30 PROCEDURE — 74011000255 HC RX REV CODE- 255: Performed by: INTERNAL MEDICINE

## 2018-10-30 PROCEDURE — 74011250636 HC RX REV CODE- 250/636: Performed by: ANESTHESIOLOGY

## 2018-10-30 PROCEDURE — 74247 XR UPPER GI W KUB AIR CONT: CPT

## 2018-10-30 PROCEDURE — 76060000031 HC ANESTHESIA FIRST 0.5 HR: Performed by: INTERNAL MEDICINE

## 2018-10-30 PROCEDURE — 76040000025: Performed by: INTERNAL MEDICINE

## 2018-10-30 PROCEDURE — 74011250636 HC RX REV CODE- 250/636

## 2018-10-30 RX ORDER — PROPOFOL 10 MG/ML
INJECTION, EMULSION INTRAVENOUS AS NEEDED
Status: DISCONTINUED | OUTPATIENT
Start: 2018-10-30 | End: 2018-10-30 | Stop reason: HOSPADM

## 2018-10-30 RX ORDER — SODIUM CHLORIDE, SODIUM LACTATE, POTASSIUM CHLORIDE, CALCIUM CHLORIDE 600; 310; 30; 20 MG/100ML; MG/100ML; MG/100ML; MG/100ML
100 INJECTION, SOLUTION INTRAVENOUS CONTINUOUS
Status: DISCONTINUED | OUTPATIENT
Start: 2018-10-30 | End: 2018-10-30 | Stop reason: HOSPADM

## 2018-10-30 RX ORDER — PROPOFOL 10 MG/ML
INJECTION, EMULSION INTRAVENOUS
Status: DISCONTINUED | OUTPATIENT
Start: 2018-10-30 | End: 2018-10-30 | Stop reason: HOSPADM

## 2018-10-30 RX ADMIN — PROPOFOL 50 MG: 10 INJECTION, EMULSION INTRAVENOUS at 08:26

## 2018-10-30 RX ADMIN — SODIUM CHLORIDE, SODIUM LACTATE, POTASSIUM CHLORIDE, AND CALCIUM CHLORIDE 100 ML/HR: 600; 310; 30; 20 INJECTION, SOLUTION INTRAVENOUS at 08:02

## 2018-10-30 RX ADMIN — PROPOFOL 140 MCG/KG/MIN: 10 INJECTION, EMULSION INTRAVENOUS at 08:27

## 2018-10-30 RX ADMIN — BARIUM SULFATE 135 ML: 980 POWDER, FOR SUSPENSION ORAL at 14:48

## 2018-10-30 NOTE — ANESTHESIA PREPROCEDURE EVALUATION
Anesthetic History Review of Systems / Medical History Patient summary reviewed and pertinent labs reviewed Pulmonary Comments: sarcoidosis Neuro/Psych  
 
 
 
TIA (questionable) Cardiovascular CAD and cardiac stents (x1 - '16) Comments: Orthostatic hypotension ECHO 2/18 - preserved EF with mild AS  
GI/Hepatic/Renal 
  
GERD Endo/Other Arthritis Other Findings Physical Exam 
 
Airway Mallampati: III 
TM Distance: 4 - 6 cm Neck ROM: normal range of motion Mouth opening: Normal 
 
 Cardiovascular Rhythm: regular Rate: normal 
 
Murmur: Grade 4, Aortic area Dental 
 
 
  
Pulmonary Breath sounds clear to auscultation Abdominal 
 
 
 
 Other Findings Anesthetic Plan ASA: 3 Anesthesia type: total IV anesthesia Induction: Intravenous Anesthetic plan and risks discussed with: Patient and Family

## 2018-10-30 NOTE — ADDENDUM NOTE
Addendum  created 10/30/18 1008 by Cj King CRNA Intraprocedure Event edited, Intraprocedure Flowsheets edited

## 2018-10-30 NOTE — DISCHARGE INSTRUCTIONS
Gastrointestinal Esophagogastroduodenoscopy (EGD) - Upper Exam Discharge Instructions    1. Call Dr. Bud Henry for any problems or questions. 2. Contact the doctor's office for follow up appointment as directed. 3. Medication may cause drowsiness for several hours, therefore, do not drive or operate machinery for remainder of the day. 4. No alcohol today. 5. Ordinarily, you may resume regular diet and activity after exam unless otherwise specified by your physician. 6. For mild soreness in your throat you may use Cepacol throat lozenges or warm salt-water gargles as needed. Any additional instructions:  Start Plavix back today, Barium swallow today. Instructions given to Martínez Merlos and other family members.

## 2018-10-30 NOTE — PROGRESS NOTES
Patient to have barium swallow today ordered by Dr Rocha. Unable to fit in schedule in radiology until 1300 today.

## 2018-10-30 NOTE — ROUTINE PROCESS
Patient taken out to car via w/c per volunteer. Pt will return to radiology at 1:00pm today for a barium swallow.

## 2018-10-30 NOTE — ANESTHESIA POSTPROCEDURE EVALUATION
Procedure(s): ESOPHAGOGASTRODUODENOSCOPY (EGD)/ 24. Anesthesia Post Evaluation Multimodal analgesia: multimodal analgesia used between 6 hours prior to anesthesia start to PACU discharge Patient location during evaluation: PACU Patient participation: complete - patient participated Level of consciousness: awake and alert Pain management: adequate Airway patency: patent Anesthetic complications: no 
Cardiovascular status: acceptable and hemodynamically stable Respiratory status: acceptable Hydration status: acceptable Visit Vitals /77 Pulse 72 Temp 36.7 °C (98 °F) Resp 16 Ht 5' 11\" (1.803 m) Wt 79.4 kg (175 lb) SpO2 98% BMI 24.41 kg/m²

## 2018-10-30 NOTE — PROCEDURES
Esophagogastroduodenoscopy    DATE of PROCEDURE: 10/30/2018    INDICATIONS:  1. Early satiety  2. Weight loss  3. GERD    MEDICATIONS ADMINISTERED: MAC    INSTRUMENT: GIF    PROCEDURE:  After obtaining informed consent, the patient was placed in the left lateral position and sedated. The endoscope was advanced under direct vision without difficulty. The esophagus, stomach (including retroflexed views) and duodenum were evaluated. The patient was taken to the recovery area in stable condition. FINDINGS:  ESOPHAGUS: normal on abbreviated exam  STOMACH: large amount of retained food in cardia and antrum. Could not see if pylorus was patent, as food covered the entire antrum  DUODENUM: not seen secondary to discontinuation of the procedure    Estimated blood loss: 0-minimal     PLAN:  1. Barium UGI series to look for obstruction.  If negative, gastric emptying study    Frank Mcleod MD  [facility], [address]

## 2018-10-30 NOTE — H&P
[facility] H&P (Admission) Date:  10/30/2018 Chief Complaint:  Weight loss, GERD, early satiety Subjective:  
 
History of Present Illness:  Patient is a 77 y.o. being admitted for EGD. PMH: 
Past Medical History:  
Diagnosis Date  Arthritis   
 generalized osteo.  CAD (coronary artery disease) 6/16/2016-had CP- stent X 1- no MI   
 Claustrophobia  GERD (gastroesophageal reflux disease)   
 probable symptoms daughter reports that lives with pt  Ill-defined condition   
 orthostatic hypotension  Sarcoidosis of lymph nodes   
 in remission  Seizures ([facility] [address] Dr Cassandra Thornton (cardio) said might have had TIA- questionable if he had a seizure  Shortness of breath  Syncope and collapse  Vertigo   
 occassional  
 
 
PSH: 
Past Surgical History:  
Procedure Laterality Date  CARDIAC SURG PROCEDURE UNLIST    
 1 stent 6/16/16 in LAD  HX CATARACT REMOVAL Bilateral   
 HX COLONOSCOPY    
 HX KNEE ARTHROSCOPY    
 left knee  HX LAP CHOLECYSTECTOMY  HX LYMPH NODE DISSECTION Sarcoid in remission  HX SHOULDER ARTHROSCOPY    
 right side  HX UROLOGICAL  2015 TURP Allergies: Allergies Allergen Reactions  Bactrim [Sulfamethoprim Ds] Other (comments) Oliguria  Keppra [Levetiracetam] Other (comments) Made legs weak  Levaquin [Levofloxacin] Diarrhea  Other Medication Other (comments) \"Probanthene and Dartal\"? States stomach medication. Numbness and shakiness. \"Made him feel he was having a heart attack\" \"Some medication he got in the \" Home Medications: 
Prior to Admission medications Medication Sig Start Date End Date Taking? Authorizing Provider  
fludrocortisone (FLORINEF) 0.1 mg tablet TAKE 1 TABLET BY MOUTH ONCE DAILY Patient taking differently: TAKE 1 TABLET BY MOUTH ONCE DAILY- for orthostatic hypotension 10/11/18  Yes Zehra Geronimo MD  
 ergocalciferol (ERGOCALCIFEROL) 50,000 unit capsule Take 1 Cap by mouth every seven (7) days. Patient taking differently: Take 50,000 Units by mouth every seven (7) days. Takes on Mondays 9/10/18  Yes Iman Walter MD  
pravastatin (PRAVACHOL) 20 mg tablet Take 1 Tab by mouth nightly. 8/30/18  Yes Juve Bell MD  
sucralfate (CARAFATE) 1 gram tablet Take 1 Tab by mouth four (4) times daily. 7/3/18  Yes Iman Walter MD  
predniSONE (DELTASONE) 2.5 mg tablet Take 2.5 mg by mouth daily (with breakfast). Every other day For OA   Yes Provider, Historical  
clopidogrel (PLAVIX) 75 mg tab Take 1 Tab by mouth daily. Patient taking differently: Take 75 mg by mouth daily. Pt took last dose 10/25/18 2/9/18  Yes Juve Bell MD  
aspirin delayed-release 81 mg tablet Take 81 mg by mouth daily. Yes Provider, Historical  
cholecalciferol (VITAMIN D3) 1,000 unit cap Take  by mouth daily. Yes Provider, Historical  
varicella-zoster recombinant, PF, (SHINGRIX, PF,) 50 mcg/0.5 mL susr injection 0.5mL by IntraMUSCular route once now and then repeat in 2-6 months 9/7/18   Ousmane Medina MD  
nitroglycerin (NITROSTAT) 0.4 mg SL tablet 1 Tab by SubLINGual route every five (5) minutes as needed for Chest Pain. 11/3/16   Juve Bell MD  
 
 
Hospital Medications: 
Current Facility-Administered Medications Medication Dose Route Frequency  lactated Ringers infusion  100 mL/hr IntraVENous CONTINUOUS Social History: 
Social History Tobacco Use  Smoking status: Former Smoker  Smokeless tobacco: Former User  Tobacco comment: quit over 20 years ago Substance Use Topics  Alcohol use: No  
 
 
 
Family History: 
Family History Problem Relation Age of Onset  Heart Disease Mother 68 MI  
 Diabetes Mother   
     borderline  Hypertension Mother  Cancer Father  Stroke Brother  Coronary Artery Disease Other Family History Review of Systems: A detailed 10 system ROS is obtained, with pertinent positives as listed above. All others are negative. Objective:  
 
Physical Exam: 
Vitals: 
Visit Vitals /75 Pulse 76 Temp 97.8 °F (36.6 °C) Resp 16 Ht 5' 11\" (1.803 m) Wt 79.4 kg (175 lb) SpO2 97% BMI 24.41 kg/m² Gen:  Pt is alert, cooperative, no acute distress Skin: no large lesions HEENT: MMM Cardiovascular: Regular rate and rhythm. No murmurs, gallops, or rubs. Respiratory:  Comfortable breathing with no accessory muscle use. Clear breath sounds with no wheezes, rales, or rhonchi. GI:  Abdomen nondistended, soft, and nontender. Normal active bowel sounds. Neurological:  Gross memory appears intact. Patient is alert and oriented. Psychiatric:  Mood appears appropriate with judgement intact. Laboratory: No results for input(s): WBC, HGB, HCT, PLT, MCV, NA, K, CL, CO2, BUN, CREA, CA, MG, GLU, AP, SGOT, GPT, TBIL, CBIL, ALB, TP, AML, LPSE, PTP, INR, APTT, HGBEXT, HCTEXT, PLTEXT in the last 72 hours. No lab exists for component: DBIL, INREXT Assessment: 
  
 
Active Problems: * No active hospital problems. * 
 
 
Plan: 
  
Endoscopy and risks explained to the patient. Risks including reaction to sedation, cardiopulmonary events, infection, bleeding, perforation, requirement for surgery if complications should occur, death were explained to patient who expressed understanding and agreed to proceed with endoscopy.  
 
 
 
Esthela Higginbotham MD 
[facility], [place]

## 2018-11-28 ENCOUNTER — HOSPITAL ENCOUNTER (OUTPATIENT)
Dept: NUCLEAR MEDICINE | Age: 78
Discharge: HOME | End: 2018-11-28
Payer: MEDICARE

## 2018-11-28 DIAGNOSIS — K30 DELAYED GASTRIC EMPTYING: ICD-10-CM

## 2018-11-28 DIAGNOSIS — R68.81 EARLY SATIETY: ICD-10-CM

## 2018-11-28 DIAGNOSIS — R93.3 ABNORMAL UPPER GASTROINTESTINAL BARIUM SERIES: ICD-10-CM

## 2018-11-28 PROCEDURE — 78264 GASTRIC EMPTYING IMG STUDY: CPT

## 2019-01-28 PROBLEM — K31.84 GASTROPARESIS: Status: ACTIVE | Noted: 2019-01-28

## 2019-04-16 ENCOUNTER — HOME HEALTH ADMISSION (OUTPATIENT)
Dept: HOME HEALTH SERVICES | Facility: HOME HEALTH | Age: 79
End: 2019-04-16

## 2019-04-17 ENCOUNTER — HOME CARE VISIT (OUTPATIENT)
Dept: HOME HEALTH SERVICES | Facility: HOME HEALTH | Age: 79
End: 2019-04-17

## 2019-04-19 ENCOUNTER — HOME CARE VISIT (OUTPATIENT)
Dept: HOME HEALTH SERVICES | Facility: HOME HEALTH | Age: 79
End: 2019-04-19

## 2019-09-24 ENCOUNTER — HOSPITAL ENCOUNTER (EMERGENCY)
Age: 79
Discharge: HOME | End: 2019-09-25
Attending: EMERGENCY MEDICINE
Payer: MEDICARE

## 2019-09-24 ENCOUNTER — APPOINTMENT (OUTPATIENT)
Dept: GENERAL RADIOLOGY | Age: 79
End: 2019-09-24
Attending: EMERGENCY MEDICINE
Payer: MEDICARE

## 2019-09-24 DIAGNOSIS — R41.82 ALTERED MENTAL STATUS, UNSPECIFIED ALTERED MENTAL STATUS TYPE: Primary | ICD-10-CM

## 2019-09-24 LAB
ALBUMIN SERPL-MCNC: 3.5 G/DL (ref 3.2–4.6)
ALBUMIN/GLOB SERPL: 0.8 {RATIO} (ref 1.2–3.5)
ALP SERPL-CCNC: 198 U/L (ref 50–136)
ALT SERPL-CCNC: 25 U/L (ref 12–65)
ANION GAP SERPL CALC-SCNC: 7 MMOL/L (ref 7–16)
AST SERPL-CCNC: 29 U/L (ref 15–37)
BACTERIA URNS QL MICRO: 0 /HPF
BASOPHILS # BLD: 0 K/UL (ref 0–0.2)
BASOPHILS NFR BLD: 0 % (ref 0–2)
BILIRUB SERPL-MCNC: 0.5 MG/DL (ref 0.2–1.1)
BUN SERPL-MCNC: 19 MG/DL (ref 8–23)
CALCIUM SERPL-MCNC: 9.6 MG/DL (ref 8.3–10.4)
CASTS URNS QL MICRO: NORMAL /LPF
CHLORIDE SERPL-SCNC: 108 MMOL/L (ref 98–107)
CO2 SERPL-SCNC: 25 MMOL/L (ref 21–32)
CREAT SERPL-MCNC: 1.01 MG/DL (ref 0.8–1.5)
DIFFERENTIAL METHOD BLD: ABNORMAL
EOSINOPHIL # BLD: 0.2 K/UL (ref 0–0.8)
EOSINOPHIL NFR BLD: 3 % (ref 0.5–7.8)
EPI CELLS #/AREA URNS HPF: NORMAL /HPF
ERYTHROCYTE [DISTWIDTH] IN BLOOD BY AUTOMATED COUNT: 13.2 % (ref 11.9–14.6)
GLOBULIN SER CALC-MCNC: 4.4 G/DL (ref 2.3–3.5)
GLUCOSE SERPL-MCNC: 131 MG/DL (ref 65–100)
HCT VFR BLD AUTO: 42.2 % (ref 41.1–50.3)
HGB BLD-MCNC: 14.1 G/DL (ref 13.6–17.2)
IMM GRANULOCYTES # BLD AUTO: 0 K/UL (ref 0–0.5)
IMM GRANULOCYTES NFR BLD AUTO: 0 % (ref 0–5)
LACTATE BLD-SCNC: 2.08 MMOL/L (ref 0.5–1.9)
LYMPHOCYTES # BLD: 1.8 K/UL (ref 0.5–4.6)
LYMPHOCYTES NFR BLD: 27 % (ref 13–44)
MCH RBC QN AUTO: 32.5 PG (ref 26.1–32.9)
MCHC RBC AUTO-ENTMCNC: 33.4 G/DL (ref 31.4–35)
MCV RBC AUTO: 97.2 FL (ref 79.6–97.8)
MONOCYTES # BLD: 0.9 K/UL (ref 0.1–1.3)
MONOCYTES NFR BLD: 13 % (ref 4–12)
NEUTS SEG # BLD: 3.9 K/UL (ref 1.7–8.2)
NEUTS SEG NFR BLD: 57 % (ref 43–78)
NRBC # BLD: 0 K/UL (ref 0–0.2)
PLATELET # BLD AUTO: 218 K/UL (ref 150–450)
PMV BLD AUTO: 10 FL (ref 9.4–12.3)
POTASSIUM SERPL-SCNC: 3.8 MMOL/L (ref 3.5–5.1)
PROCALCITONIN SERPL-MCNC: <0.1 NG/ML
PROT SERPL-MCNC: 7.9 G/DL (ref 6.3–8.2)
RBC # BLD AUTO: 4.34 M/UL (ref 4.23–5.6)
RBC #/AREA URNS HPF: NORMAL /HPF
SODIUM SERPL-SCNC: 140 MMOL/L (ref 136–145)
WBC # BLD AUTO: 6.9 K/UL (ref 4.3–11.1)
WBC URNS QL MICRO: NORMAL /HPF

## 2019-09-24 PROCEDURE — 51701 INSERT BLADDER CATHETER: CPT | Performed by: EMERGENCY MEDICINE

## 2019-09-24 PROCEDURE — 81003 URINALYSIS AUTO W/O SCOPE: CPT | Performed by: EMERGENCY MEDICINE

## 2019-09-24 PROCEDURE — 81015 MICROSCOPIC EXAM OF URINE: CPT

## 2019-09-24 PROCEDURE — 93005 ELECTROCARDIOGRAM TRACING: CPT | Performed by: EMERGENCY MEDICINE

## 2019-09-24 PROCEDURE — 83605 ASSAY OF LACTIC ACID: CPT

## 2019-09-24 PROCEDURE — 51798 US URINE CAPACITY MEASURE: CPT | Performed by: EMERGENCY MEDICINE

## 2019-09-24 PROCEDURE — 71046 X-RAY EXAM CHEST 2 VIEWS: CPT

## 2019-09-24 PROCEDURE — 85025 COMPLETE CBC W/AUTO DIFF WBC: CPT

## 2019-09-24 PROCEDURE — 77030011943

## 2019-09-24 PROCEDURE — 80053 COMPREHEN METABOLIC PANEL: CPT

## 2019-09-24 PROCEDURE — 87040 BLOOD CULTURE FOR BACTERIA: CPT

## 2019-09-24 PROCEDURE — 99285 EMERGENCY DEPT VISIT HI MDM: CPT | Performed by: EMERGENCY MEDICINE

## 2019-09-24 PROCEDURE — 84145 PROCALCITONIN (PCT): CPT

## 2019-09-24 NOTE — ED TRIAGE NOTES
Pt arrives with daughter with c/o urinary pain and altered mental status. Daughter reports onset last Monday when seen by urologist. Daughter reports rash to scrotum. Attempted cream without relief. Daughter states urine test performed last Monday, negative however daughter states did not culture. Multiple recent UTIs, seen at [facility] for that. Confused on arrival. Daughter reports non-productive cough.

## 2019-09-25 VITALS
HEIGHT: 72 IN | HEART RATE: 85 BPM | WEIGHT: 185 LBS | OXYGEN SATURATION: 96 % | TEMPERATURE: 98.2 F | RESPIRATION RATE: 20 BRPM | DIASTOLIC BLOOD PRESSURE: 79 MMHG | BODY MASS INDEX: 25.06 KG/M2 | SYSTOLIC BLOOD PRESSURE: 128 MMHG

## 2019-09-25 NOTE — ED NOTES
Pt resting in bed, pt denies any additional needs at this time. Pt respirations even and unlabored. Pt bed locked and low, call bell within reach. Vital signs being monitored.

## 2019-09-25 NOTE — ED PROVIDER NOTES
28-year-old gentleman presents with some confusion and chills. confused through the weekend over the last 3 to 4 days, progressing to the point of visual hallucinations today. Patient was just recently treated with ciprofloxacin for a UTI with Pseudomonas on culture was sensitive to Cipro. He finished the antibiotics on the 16th. The 17th but urine was not obtained. Clean-catch specimen was obtained at home 20th, dipstick was negative but microscopic and culture were not performed on it. Past Medical History:   Diagnosis Date    Arthritis     generalized osteo.     CAD (coronary artery disease)     6/16/2016-had CP- stent X 1- no MI     Claustrophobia     GERD (gastroesophageal reflux disease)     probable symptoms daughter reports that lives with pt    Ill-defined condition     orthostatic hypotension    Sarcoidosis of lymph nodes     in remission    Seizures ([facility])     Dr Citlali Westbrook (cardio) said might have had TIA- questionable if he had a seizure    Shortness of breath     Syncope and collapse     Vertigo     occassional       Past Surgical History:   Procedure Laterality Date    CARDIAC SURG PROCEDURE UNLIST      1 stent 6/16/16 in LAD    HX CATARACT REMOVAL Bilateral     HX COLONOSCOPY      HX KNEE ARTHROSCOPY      left knee    HX LAP CHOLECYSTECTOMY      HX LYMPH NODE DISSECTION      Sarcoid in remission    HX SHOULDER ARTHROSCOPY      right side     HX UROLOGICAL  2015    TURP         Family History:   Problem Relation Age of Onset    Heart Disease Mother 68        MI    Diabetes Mother         borderline     Hypertension Mother     Cancer Father     Stroke Brother     Coronary Artery Disease Other         Family History       Social History     Socioeconomic History    Marital status:      Spouse name: Not on file    Number of children: Not on file    Years of education: Not on file    Highest education level: Not on file   Occupational History    Not on file Social Needs    Financial resource strain: Not on file    Food insecurity:     Worry: Not on file     Inability: Not on file    Transportation needs:     Medical: Not on file     Non-medical: Not on file   Tobacco Use    Smoking status: Former Smoker    Smokeless tobacco: Former User    Tobacco comment: quit over 20 years ago   Substance and Sexual Activity    Alcohol use: No    Drug use: No    Sexual activity: Not on file   Lifestyle    Physical activity:     Days per week: Not on file     Minutes per session: Not on file    Stress: Not on file   Relationships    Social connections:     Talks on phone: Not on file     Gets together: Not on file     Attends Islam service: Not on file     Active member of club or organization: Not on file     Attends meetings of clubs or organizations: Not on file     Relationship status: Not on file    Intimate partner violence:     Fear of current or ex partner: Not on file     Emotionally abused: Not on file     Physically abused: Not on file     Forced sexual activity: Not on file   Other Topics Concern    Not on file   Social History Narrative    Not on file         ALLERGIES: Bactrim [sulfamethoprim ds]; Keppra [levetiracetam]; Levaquin [levofloxacin]; and Other medication    Review of Systems   Constitutional: Positive for chills. Negative for fever. HENT: Negative for rhinorrhea and sore throat. Eyes: Negative for discharge and redness. Respiratory: Positive for cough. Negative for shortness of breath. Cardiovascular: Negative for chest pain and palpitations. Gastrointestinal: Negative for abdominal pain, diarrhea, nausea and vomiting. Genitourinary: Negative for dysuria and frequency. Urinary hesitancy,   but once he states his flow is normal/adequate  Patient denies dysuria   Musculoskeletal: Negative for arthralgias and back pain. Skin: Positive for rash. Neurological: Negative for dizziness and headaches. Psychiatric/Behavioral: Positive for confusion and hallucinations. All other systems reviewed and are negative. Vitals:    09/1940 09/24/19 1951 09/24/19 1958   BP: 106/74 117/70 115/69   Pulse: 98 87 (!) 104   Resp: 20 26    Temp: 98.2 °F (36.8 °C)     SpO2: 93% (!) 86%    Weight: 83.9 kg (185 lb)     Height: 6' (1.829 m)              Physical Exam   Constitutional: He is oriented to person, place, and time. He appears well-developed and well-nourished. Non-toxic appearance. He does not appear ill. No distress. HENT:   Head: Normocephalic and atraumatic. Eyes: Pupils are equal, round, and reactive to light. Conjunctivae are normal. Right eye exhibits no discharge. Left eye exhibits no discharge. No scleral icterus. Neck: Normal range of motion. Neck supple. Cardiovascular: Normal rate, regular rhythm and normal heart sounds. Exam reveals no gallop. No murmur heard. Pulmonary/Chest: Effort normal and breath sounds normal. No respiratory distress. He has no wheezes. He has no rales. Abdominal: Soft. Bowel sounds are normal. There is no tenderness. There is no guarding. Musculoskeletal: Normal range of motion. He exhibits no edema. Neurological: He is alert and oriented to person, place, and time. He is not disoriented. No cranial nerve deficit. He exhibits normal muscle tone. GCS eye subscore is 4. GCS verbal subscore is 5. GCS motor subscore is 6.   cni 2-12 grossly   Skin: Skin is warm and dry. He is not diaphoretic. Psychiatric: He has a normal mood and affect. His behavior is normal.   Nursing note and vitals reviewed. MDM  Number of Diagnoses or Management Options  Altered mental status, unspecified altered mental status type:   Diagnosis management comments: Medical decision making note:  Slight confusion for the last 4 days a little worse today with visual hallucinations.   Recent UTIs, lactic acid of 2.08 noted, patient just completed Cipro for Pseudomonas UTI on the 16th.  Labs otherwise look good today to include a procalcitonin and chest x-ray, cath urine specimen shows 20-50 white cells but no bacteria no nitrite. We will watch this is a culture and refrain from additional antibiotics at this point since he has no acute UTI symptoms. Multiple family members at home to watch after him - all are comfortable going home with careful observation. This concludes the \"medical decision making note\" part of this emergency department visit note.            Procedures

## 2019-09-25 NOTE — ED NOTES
I have reviewed discharge instructions with the caregiver. The caregiver verbalized understanding. Patient left ED via Discharge Method: wheelchair to Home with granddaughter. Opportunity for questions and clarification provided. Patient given 0 scripts. To continue your aftercare when you leave the hospital, you may receive an automated call from our care team to check in on how you are doing. This is a free service and part of our promise to provide the best care and service to meet your aftercare needs.  If you have questions, or wish to unsubscribe from this service please call [phone]. Thank you for Choosing our [facility] Department.

## 2019-09-25 NOTE — DISCHARGE INSTRUCTIONS
Drink plenty of fluids  Watch the urine and blood cultures on \"my chart\", will be watching them too  Follow-up for cystoscopy as planned  Return to the ER if worse in any way      Patient Education     Altered Mental Status: Care Instructions  Your Care Instructions  Altered mental status is a change in how well your brain is working. As a result, you may be confused, be less alert than usual, or act in odd ways. This may include seeing or hearing things that aren't really there (hallucinations). A mental status change has many possible causes. For example, it may be the result of an infection, an imbalance of chemicals in the body, or a chronic disease such as diabetes or COPD. It can also be caused by things such as a head injury, taking certain medicines, or using alcohol or drugs. The doctor may do tests to look for the cause. These tests may include urine tests, blood tests, and imaging tests such as a CT scan. Sometimes a clear cause isn't found. But tests can help the doctor rule out a serious cause of your symptoms. A change in mental status can be scary. But mental status will often return to normal when the cause is treated. So it is important to get any follow-up testing or treatment the doctor has suggested. The doctor has checked you carefully, but problems can develop later. If you notice any problems or new symptoms, get medical treatment right away. Follow-up care is a key part of your treatment and safety. Be sure to make and go to all appointments, and call your doctor if you are having problems. It's also a good idea to know your test results and keep a list of the medicines you take. How can you care for yourself at home? · Be safe with medicines. Take your medicines exactly as prescribed. Call your doctor if you think you are having a problem with your medicine. · Have another adult stay with you until you are better. This can help keep you safe.  Ask that person to watch for signs that your mental status is getting worse. When should you call for help? Call 911 anytime you think you may need emergency care. For example, call if:  · You passed out (lost consciousness). Call your doctor now or seek immediate medical care if:  · Your mental status is getting worse. · You have new symptoms, such as a fever, chills, or shortness of breath. · You do not feel safe. Watch closely for changes in your health, and be sure to contact your doctor if:  · You do not get better as expected. Where can you learn more? Go to [employer]  Enter J452 in the search box to learn more about \"Altered Mental Status: Care Instructions. \"   © 4984-0544 Health[employer], [employer]. Care instructions adapted under license by [facility] (which disclaims liability or warranty for this information). This care instruction is for use with your licensed healthcare professional. If you have questions about a medical condition or this instruction, always ask your healthcare professional. [place] [address] any warranty or liability for your use of this information.   Content Version: 20.1.950255; Current as of: November 20, 2015

## 2019-09-29 LAB
BACTERIA SPEC CULT: NORMAL
BACTERIA SPEC CULT: NORMAL
SERVICE CMNT-IMP: NORMAL
SERVICE CMNT-IMP: NORMAL

## 2020-09-11 PROBLEM — I50.31 ACUTE DIASTOLIC CONGESTIVE HEART FAILURE (HCC): Status: ACTIVE | Noted: 2020-09-11

## 2020-09-11 PROBLEM — I35.0 NONRHEUMATIC AORTIC VALVE STENOSIS: Status: ACTIVE | Noted: 2020-09-11

## 2020-09-28 PROBLEM — I35.0 NONRHEUMATIC AORTIC VALVE STENOSIS: Chronic | Status: ACTIVE | Noted: 2020-09-11

## 2020-09-28 PROBLEM — I50.42 CHRONIC COMBINED SYSTOLIC AND DIASTOLIC CONGESTIVE HEART FAILURE (HCC): Chronic | Status: ACTIVE | Noted: 2020-09-11

## 2020-09-28 PROBLEM — I35.8 AORTIC VALVE SCLEROSIS: Status: RESOLVED | Noted: 2017-10-20 | Resolved: 2020-09-28

## 2020-09-29 ENCOUNTER — TELEPHONE (OUTPATIENT)
Dept: CASE MANAGEMENT | Age: 80
End: 2020-09-29

## 2020-09-29 DIAGNOSIS — I35.0 AORTIC VALVE STENOSIS, ETIOLOGY OF CARDIAC VALVE DISEASE UNSPECIFIED: Primary | ICD-10-CM

## 2020-09-29 DIAGNOSIS — I25.10 CORONARY ARTERY DISEASE INVOLVING NATIVE CORONARY ARTERY OF NATIVE HEART WITHOUT ANGINA PECTORIS: Primary | ICD-10-CM

## 2020-09-29 NOTE — TELEPHONE ENCOUNTER
Spoke with pt daughter, Pierce De La Torre, who takes care of appts for pt -regarding TAVR CT and carotid US plans for 10/7 @0930 with arrival @1922. Instructed her to enter hospital on the outpatient side, go to 2nd floor, and check in at radiology. No food 4 hr prior and nothing to drink 2 hr prior except for sips of water with medications. She verbalized understanding and contact information ([phone]) provided for any further questions.     Mick Ann, Structural Heart Navigator

## 2020-10-07 ENCOUNTER — HOSPITAL ENCOUNTER (OUTPATIENT)
Dept: ULTRASOUND IMAGING | Age: 80
Discharge: HOME | End: 2020-10-07
Attending: INTERNAL MEDICINE
Payer: MEDICARE

## 2020-10-07 ENCOUNTER — HOSPITAL ENCOUNTER (OUTPATIENT)
Dept: CT IMAGING | Age: 80
Discharge: HOME | End: 2020-10-07
Attending: INTERNAL MEDICINE
Payer: MEDICARE

## 2020-10-07 DIAGNOSIS — I35.0 AORTIC VALVE STENOSIS, ETIOLOGY OF CARDIAC VALVE DISEASE UNSPECIFIED: ICD-10-CM

## 2020-10-07 LAB — CREAT BLD-MCNC: 1 MG/DL (ref 0.8–1.5)

## 2020-10-07 PROCEDURE — 82565 ASSAY OF CREATININE: CPT

## 2020-10-07 PROCEDURE — 74011000636 HC RX REV CODE- 636: Performed by: INTERNAL MEDICINE

## 2020-10-07 PROCEDURE — 75574 CT ANGIO HRT W/3D IMAGE: CPT

## 2020-10-07 PROCEDURE — 93880 EXTRACRANIAL BILAT STUDY: CPT

## 2020-10-07 PROCEDURE — 71275 CT ANGIOGRAPHY CHEST: CPT

## 2020-10-07 PROCEDURE — 74011000258 HC RX REV CODE- 258: Performed by: INTERNAL MEDICINE

## 2020-10-07 RX ORDER — SODIUM CHLORIDE 0.9 % (FLUSH) 0.9 %
10 SYRINGE (ML) INJECTION
Status: COMPLETED | OUTPATIENT
Start: 2020-10-07 | End: 2020-10-07

## 2020-10-07 RX ADMIN — IOPAMIDOL 75 ML: 755 INJECTION, SOLUTION INTRAVENOUS at 09:48

## 2020-10-07 RX ADMIN — SODIUM CHLORIDE 100 ML: 900 INJECTION, SOLUTION INTRAVENOUS at 09:48

## 2020-10-07 RX ADMIN — Medication 10 ML: at 09:47

## 2020-10-13 NOTE — PROGRESS NOTES
Pre assessment phone call placed, no answer and VM left with return phone number. Instructed of arrival time on 10/14/2020 @ 0730 for [facility]. Advised of visitor policy, pack overnight bag, take ASA 81 mg x 4 with Plavix 75 mg prior to arrival, and NPO after midnight.

## 2020-10-14 ENCOUNTER — HOSPITAL ENCOUNTER (OUTPATIENT)
Dept: CARDIAC CATH/INVASIVE PROCEDURES | Age: 80
Discharge: HOME | End: 2020-10-14
Attending: INTERNAL MEDICINE | Admitting: INTERNAL MEDICINE
Payer: MEDICARE

## 2020-10-14 VITALS
HEIGHT: 72 IN | HEART RATE: 80 BPM | WEIGHT: 199 LBS | DIASTOLIC BLOOD PRESSURE: 70 MMHG | OXYGEN SATURATION: 94 % | BODY MASS INDEX: 26.95 KG/M2 | SYSTOLIC BLOOD PRESSURE: 107 MMHG | RESPIRATION RATE: 12 BRPM

## 2020-10-14 DIAGNOSIS — I25.10 CORONARY ARTERY DISEASE INVOLVING NATIVE CORONARY ARTERY OF NATIVE HEART WITHOUT ANGINA PECTORIS: ICD-10-CM

## 2020-10-14 DIAGNOSIS — I35.0 NONRHEUMATIC AORTIC VALVE STENOSIS: Chronic | ICD-10-CM

## 2020-10-14 LAB
ANION GAP SERPL CALC-SCNC: 7 MMOL/L (ref 7–16)
ATRIAL RATE: 84 BPM
BUN SERPL-MCNC: 13 MG/DL (ref 8–23)
CALCIUM SERPL-MCNC: 9.5 MG/DL (ref 8.3–10.4)
CALCULATED P AXIS, ECG09: 32 DEGREES
CALCULATED R AXIS, ECG10: 37 DEGREES
CALCULATED T AXIS, ECG11: 115 DEGREES
CHLORIDE SERPL-SCNC: 106 MMOL/L (ref 98–107)
CO2 SERPL-SCNC: 28 MMOL/L (ref 21–32)
CREAT SERPL-MCNC: 0.97 MG/DL (ref 0.8–1.5)
DIAGNOSIS, 93000: NORMAL
ERYTHROCYTE [DISTWIDTH] IN BLOOD BY AUTOMATED COUNT: 14.3 % (ref 11.9–14.6)
GLUCOSE SERPL-MCNC: 96 MG/DL (ref 65–100)
HCT VFR BLD AUTO: 42.7 % (ref 41.1–50.3)
HGB BLD-MCNC: 13.7 G/DL (ref 13.6–17.2)
INR PPP: 1
MAGNESIUM SERPL-MCNC: 2.4 MG/DL (ref 1.8–2.4)
MCH RBC QN AUTO: 30.2 PG (ref 26.1–32.9)
MCHC RBC AUTO-ENTMCNC: 32.1 G/DL (ref 31.4–35)
MCV RBC AUTO: 94.3 FL (ref 79.6–97.8)
NRBC # BLD: 0 K/UL (ref 0–0.2)
P-R INTERVAL, ECG05: 192 MS
PLATELET # BLD AUTO: 252 K/UL (ref 150–450)
PMV BLD AUTO: 9.2 FL (ref 9.4–12.3)
POTASSIUM SERPL-SCNC: 4.3 MMOL/L (ref 3.5–5.1)
PROTHROMBIN TIME: 12.9 SEC (ref 12–14.7)
Q-T INTERVAL, ECG07: 364 MS
QRS DURATION, ECG06: 82 MS
QTC CALCULATION (BEZET), ECG08: 430 MS
RBC # BLD AUTO: 4.53 M/UL (ref 4.23–5.6)
SODIUM SERPL-SCNC: 141 MMOL/L (ref 136–145)
VENTRICULAR RATE, ECG03: 84 BPM
WBC # BLD AUTO: 8.6 K/UL (ref 4.3–11.1)

## 2020-10-14 PROCEDURE — 85027 COMPLETE CBC AUTOMATED: CPT

## 2020-10-14 PROCEDURE — 93005 ELECTROCARDIOGRAM TRACING: CPT | Performed by: INTERNAL MEDICINE

## 2020-10-14 PROCEDURE — 77030029997 HC DEV COM RDL R BND TELE -B

## 2020-10-14 PROCEDURE — 83735 ASSAY OF MAGNESIUM: CPT

## 2020-10-14 PROCEDURE — 93458 L HRT ARTERY/VENTRICLE ANGIO: CPT

## 2020-10-14 PROCEDURE — 99152 MOD SED SAME PHYS/QHP 5/>YRS: CPT

## 2020-10-14 PROCEDURE — 74011000636 HC RX REV CODE- 636: Performed by: INTERNAL MEDICINE

## 2020-10-14 PROCEDURE — C1769 GUIDE WIRE: HCPCS

## 2020-10-14 PROCEDURE — 77030015766

## 2020-10-14 PROCEDURE — 85610 PROTHROMBIN TIME: CPT

## 2020-10-14 PROCEDURE — 75716 ARTERY X-RAYS ARMS/LEGS: CPT

## 2020-10-14 PROCEDURE — 80048 BASIC METABOLIC PNL TOTAL CA: CPT

## 2020-10-14 PROCEDURE — 74011000250 HC RX REV CODE- 250: Performed by: INTERNAL MEDICINE

## 2020-10-14 PROCEDURE — 74011250636 HC RX REV CODE- 250/636: Performed by: INTERNAL MEDICINE

## 2020-10-14 PROCEDURE — 77030016699 HC CATH ANGI DX INFN1 CARD -A

## 2020-10-14 PROCEDURE — C1894 INTRO/SHEATH, NON-LASER: HCPCS

## 2020-10-14 PROCEDURE — 93454 CORONARY ARTERY ANGIO S&I: CPT | Performed by: INTERNAL MEDICINE

## 2020-10-14 RX ORDER — MIDAZOLAM HYDROCHLORIDE 1 MG/ML
.5-2 INJECTION, SOLUTION INTRAMUSCULAR; INTRAVENOUS
Status: DISCONTINUED | OUTPATIENT
Start: 2020-10-14 | End: 2020-10-14 | Stop reason: HOSPADM

## 2020-10-14 RX ORDER — FENTANYL CITRATE 50 UG/ML
25-50 INJECTION, SOLUTION INTRAMUSCULAR; INTRAVENOUS
Status: DISCONTINUED | OUTPATIENT
Start: 2020-10-14 | End: 2020-10-14 | Stop reason: HOSPADM

## 2020-10-14 RX ORDER — HEPARIN SODIUM 200 [USP'U]/100ML
3 INJECTION, SOLUTION INTRAVENOUS CONTINUOUS
Status: DISCONTINUED | OUTPATIENT
Start: 2020-10-14 | End: 2020-10-14 | Stop reason: HOSPADM

## 2020-10-14 RX ORDER — LIDOCAINE HYDROCHLORIDE 10 MG/ML
10-30 INJECTION, SOLUTION EPIDURAL; INFILTRATION; INTRACAUDAL; PERINEURAL ONCE
Status: COMPLETED | OUTPATIENT
Start: 2020-10-14 | End: 2020-10-14

## 2020-10-14 RX ORDER — SODIUM CHLORIDE 0.9 % (FLUSH) 0.9 %
5-40 SYRINGE (ML) INJECTION AS NEEDED
Status: DISCONTINUED | OUTPATIENT
Start: 2020-10-14 | End: 2020-10-14 | Stop reason: HOSPADM

## 2020-10-14 RX ORDER — SODIUM CHLORIDE 9 MG/ML
75 INJECTION, SOLUTION INTRAVENOUS CONTINUOUS
Status: DISCONTINUED | OUTPATIENT
Start: 2020-10-14 | End: 2020-10-14 | Stop reason: HOSPADM

## 2020-10-14 RX ORDER — SODIUM CHLORIDE 9 MG/ML
250 INJECTION, SOLUTION INTRAVENOUS CONTINUOUS
Status: DISCONTINUED | OUTPATIENT
Start: 2020-10-14 | End: 2020-10-14 | Stop reason: HOSPADM

## 2020-10-14 RX ORDER — SODIUM CHLORIDE 0.9 % (FLUSH) 0.9 %
5-40 SYRINGE (ML) INJECTION EVERY 8 HOURS
Status: DISCONTINUED | OUTPATIENT
Start: 2020-10-14 | End: 2020-10-14 | Stop reason: HOSPADM

## 2020-10-14 RX ORDER — GUAIFENESIN 100 MG/5ML
324 LIQUID (ML) ORAL
Status: DISCONTINUED | OUTPATIENT
Start: 2020-10-14 | End: 2020-10-14 | Stop reason: HOSPADM

## 2020-10-14 RX ADMIN — HEPARIN SODIUM 3 UNITS/HR: 200 INJECTION, SOLUTION INTRAVENOUS at 12:36

## 2020-10-14 RX ADMIN — HEPARIN SODIUM 2 ML: 10000 INJECTION INTRAVENOUS; SUBCUTANEOUS at 12:36

## 2020-10-14 RX ADMIN — MIDAZOLAM 2 MG: 1 INJECTION INTRAMUSCULAR; INTRAVENOUS at 12:48

## 2020-10-14 RX ADMIN — LIDOCAINE HYDROCHLORIDE 10 ML: 10 INJECTION, SOLUTION EPIDURAL; INFILTRATION; INTRACAUDAL; PERINEURAL at 12:36

## 2020-10-14 RX ADMIN — IOPAMIDOL 50 ML: 755 INJECTION, SOLUTION INTRAVENOUS at 12:48

## 2020-10-14 RX ADMIN — FENTANYL CITRATE 50 MCG: 50 INJECTION, SOLUTION INTRAMUSCULAR; INTRAVENOUS at 12:48

## 2020-10-14 NOTE — CONSULTS
Benja Husain. MD Sun Meraz. Tori Manuel MD             Satya Mckee         10/14/2020        1940    REFERRING PHYSICIAN:  Dr. Maggi Dueñas:  The patient is a 79 y.o. male who is seen for evaluation of AS and MVCAD. He has been followed in the office by Dr. Serafin Jimenez. He is essentially wheelchair bound. He relies on wheelchair to move from room to room. He requires assistance moving from wheelchair to bedside commode. He was admitted to [facility] in August. His daughter states he has been much weaker since then. He denies any chest pain. Daughter states that he occasionally states his chest is \"sore. \" Recent echo showed severe AS with GUILLERMO of 0.84cm2, mean gradient of 23, peak gradient of 33.4mmHg. He underwent cardiac catheterization today that showed heavily calcified LCx with 95% ostial stenosis. He has obstructive disease in the diagonal and ISR in distal LAD. Past Medical History:   Diagnosis Date    Arthritis     generalized osteo.     CAD (coronary artery disease)     6/16/2016-had CP- stent X 1- no MI     Claustrophobia     GERD (gastroesophageal reflux disease)     probable symptoms daughter reports that lives with pt    Ill-defined condition     orthostatic hypotension    Sarcoidosis of lymph nodes     in remission    Seizures (HCC)     Dr Delroy Strickland (cardio) said might have had TIA- questionable if he had a seizure    Shortness of breath     Syncope and collapse     Vertigo     occassional       Past Surgical History:   Procedure Laterality Date    CARDIAC SURG PROCEDURE UNLIST      1 stent 6/16/16 in LAD    HX CATARACT REMOVAL Bilateral     HX COLONOSCOPY      HX KNEE ARTHROSCOPY      left knee    HX LAP CHOLECYSTECTOMY      HX LYMPH NODE DISSECTION      Sarcoid in remission    HX SHOULDER ARTHROSCOPY      right side     HX UROLOGICAL  2015    TURP       Family History   Problem Relation Age of Onset    Heart Disease Mother 68        [facility] Diabetes Mother         borderline     Hypertension Mother     Cancer Father     Stroke Brother     Coronary Artery Disease Other         Family History       Social History     Socioeconomic History    Marital status:      Spouse name: Not on file    Number of children: Not on file    Years of education: Not on file    Highest education level: Not on file   Occupational History    Not on file   Social Needs    Financial resource strain: Not on file    Food insecurity     Worry: Not on file     Inability: Not on file    Transportation needs     Medical: Not on file     Non-medical: Not on file   Tobacco Use    Smoking status: Former Smoker    Smokeless tobacco: Former User    Tobacco comment: quit over 20 years ago   Substance and Sexual Activity    Alcohol use: No    Drug use: No    Sexual activity: Not on file   Lifestyle    Physical activity     Days per week: Not on file     Minutes per session: Not on file    Stress: Not on file   Relationships    Social connections     Talks on phone: Not on file     Gets together: Not on file     Attends [employer] service: Not on file     Active member of club or organization: Not on file     Attends meetings of clubs or organizations: Not on file     Relationship status: Not on file    Intimate partner violence     Fear of current or ex partner: Not on file     Emotionally abused: Not on file     Physically abused: Not on file     Forced sexual activity: Not on file   Other Topics Concern    Not on file   Social History Narrative    Not on file       Allergies   Allergen Reactions    Bactrim [Sulfamethoprim Ds] Other (comments)     Oliguria    Keppra [Levetiracetam] Other (comments)     Made legs weak    Levaquin [Levofloxacin] Diarrhea    Other Medication Other (comments)     \"Probanthene and Dartal\"? States stomach medication. Numbness and shakiness.  \"Made him feel he was having a heart attack\"  \"Some medication he got in the \" No current facility-administered medications on file prior to encounter. Current Outpatient Medications on File Prior to Encounter   Medication Sig Dispense Refill    clopidogreL (PLAVIX) 75 mg tab Take 1 tablet by mouth once daily 90 Tab 0    tamsulosin (FLOMAX) 0.4 mg capsule Take 0.4 mg by mouth daily.  DIGEST PROBIOTIC, S.BOULARDII, 250 mg capsule TAKE 1 CAPSULE BY MOUTH TWICE A DAY      ergocalciferol (ERGOCALCIFEROL) 50,000 unit capsule TAKE 1 CAPSULE BY MOUTH ONCE EVERY SEVEN DAYS 12 Cap 11    aspirin delayed-release 81 mg tablet Take 81 mg by mouth daily.  lisinopriL (PRINIVIL, ZESTRIL) 5 mg tablet Take  by mouth daily.  carvediloL (COREG) 6.25 mg tablet Take  by mouth two (2) times daily (with meals).  acetaminophen (TYLENOL) 500 mg tablet Take 1,000 mg by mouth.  nystatin (MYCOSTATIN) topical cream Apply  to affected area two (2) times a day. 45 g 3    nitroglycerin (NITROSTAT) 0.4 mg SL tablet 1 Tab by SubLINGual route every five (5) minutes as needed for Chest Pain. 1 Bottle 11       REVIEW OF SYSTEMS:  Review of Systems   Constitution: Negative for chills, fever, malaise/fatigue, weight gain and weight loss. HENT: Negative for ear pain, hearing loss, nosebleeds, sore throat and tinnitus. Eyes: Negative for blurred vision, vision loss in left eye and vision loss in right eye. Cardiovascular: Positive for dyspnea on exertion. Negative for chest pain, leg swelling, near-syncope, orthopnea, palpitations, paroxysmal nocturnal dyspnea and syncope. Respiratory: Positive for shortness of breath. Negative for cough, hemoptysis, sputum production and wheezing. Endocrine: Negative for cold intolerance, heat intolerance and polydipsia. Hematologic/Lymphatic: Does not bruise/bleed easily. Skin: Negative for color change and rash. Musculoskeletal: Positive for joint pain. Negative for back pain, joint swelling and myalgias.    Gastrointestinal: Negative for abdominal pain, constipation, diarrhea, dysphagia, heartburn, hematemesis, melena, nausea and vomiting. Genitourinary: Negative for dysuria, frequency, hematuria and urgency. Neurological: Positive for paresthesias and weakness. Negative for difficulty with concentration, dizziness, headaches, light-headedness, numbness, seizures and vertigo. Psychiatric/Behavioral: Negative for altered mental status and depression. Physical Exam  Vitals:    10/14/20 1245 10/14/20 1300 10/14/20 1315 10/14/20 1330   BP: 123/68 121/74 127/65 124/71   Pulse: 80      Resp: 12      SpO2: 100%  94% 95%   Weight:       Height:           Physical Exam:  General: Well Developed, Well Nourished, No Acute Distress  HEENT: Normocephalic, pupils equal and round, no scleral icterus  Neck: supple, no JVD  Chest wall: No deformity  Heart: S1S2 with RRR   Lungs: Clear throughout auscultation bilaterally without adventitious sounds  Vascular: Pulses are full and equal. There is no venous stasis disease. Abd: soft, nontender, nondistended, with good bowel sounds, no pulsatile masses  Ext: warm, no edema, calves supple/nontender, pulses 2+ bilaterally  Skin: warm and dry, no rashes, cyanosis, jaundice, ecchymoses or evidence of skin breakdown  Psychiatric: Normal mood and affect  Neurologic: Alert and oriented X 3, no focal deficit noted    Labs:   Recent Labs     10/14/20  0806      K 4.3   MG 2.4   BUN 13   CREA 0.97   GLU 96   WBC 8.6   HGB 13.7   HCT 42.7      INR 1.0       Lab Results   Component Value Date/Time    Cholesterol, total 166 09/07/2018 11:48 AM    HDL Cholesterol 63 09/07/2018 11:48 AM    LDL, calculated 88 09/07/2018 11:48 AM    VLDL, calculated 15 09/07/2018 11:48 AM    Triglyceride 74 09/07/2018 11:48 AM       Assessment:     Active Problems:    * Aortic Stenosis  CAD   Orthostatic Hypotension  Dementia  Dyslipidemia  Seizure disorder       Plan:     AVR/CABG discussed vs TAVR +/- PCI.  He is very weak and debilitated. He is wheelchair bound and requires total assistance out of wheelchair. TAVR +/- PCI is recommended.        Haylee Encarnacion PA-C

## 2020-10-14 NOTE — DISCHARGE INSTRUCTIONS
HEART CATHETERIZATION/ANGIOGRAPHY DISCHARGE INSTRUCTIONS    1. Check puncture site frequently for swelling or bleeding. If there is any bleeding, lie down and apply pressure over the area with a clean towel or washcloth. Notify your doctor for any redness, swelling, drainage, or oozing from the puncture site. Notify your doctor for any fever or chills. 2. If the extremity becomes cold, numb, or painful call Dr. Ismael Powers at [phone].  3. Activity should be limited for the next 48 hours. Climb stairs as little as possible and avoid any stooping, bending, or strenuous activity for 48 hours. No heavy lifting (anything over 10 pounds) for 3 days. 4. You may resume your usual diet. Drink more fluids than usual.  5. Have a responsible person drive you home and stay with you for at least 24 hours after your heart catheterization/angiography. 6. You may remove bandage from your Right Arm in 24 hours. You may shower in 24 hours. No tub baths, hot tubs, or swimming for 1 week. Do not place any lotions, creams, powders, or ointments over puncture site for 1 week. You may place a clean band-aid over the puncture site each day for 5 days. Change daily. I have read the above instructions and have had the opportunity to ask questions.       Patient: ________________________   Date: 10/14/2020    Witness: _______________________   Date: 10/14/2020

## 2020-10-14 NOTE — PROGRESS NOTES
TRANSFER - IN REPORT:    Verbal report received from CAREY Martinez on Price Betters being received from [facility] for routine progression of care      Report consisted of patients Situation, Background, Assessment and Recommendations(SBAR). Information from the following report(s) Procedure Summary was reviewed with the receiving nurse. Opportunity for questions and clarification was provided. Assessment completed upon patients arrival to unit and care assumed.

## 2020-10-14 NOTE — PROCEDURES
Brief Cardiac Procedure Note    Patient: Stephanie Hardy MRN: [id number]  SSN: [id number]    YOB: 1940  Age: 79 y.o. Sex: male      Date of Procedure: 10/14/2020     Pre-procedure Diagnosis: Typical Angina, Aortic Stenosis and Congestive Heart Failure    Post-procedure Diagnosis: Aortic Stenosis, Congestive Heart Failure and Coronary Artery Disease    Procedure: Left Heart Catheterization    Brief Description of Procedure: See note    Performed By: Baron Philip MD     Assistants: None    Anesthesia: Moderate Sedation    Estimated Blood Loss: Less than 10 mL      Specimens: None    Implants: None    Findings:     LM:  Ca++  LAD:  80% large D1, 99% ISR dLAD  RI:  NML  LCx:  Heavy Ca++, 95% ostial LCx  RCA:  30-40% Proximal    (L) LE runoff - large vessels - very high bifurcation    (R)LE runoff - large vessels with low bifurcation    ? AVR/CABG vs high risk PCI TAVR    Complications: None    Recommendations: Continue medical therapy.     Signed By: Baron Philip MD     October 14, 2020

## 2020-10-14 NOTE — PROGRESS NOTES
Patient received to [address] [place] [address] room # 11. Ambulatory from [facility]. Patient scheduled for [facility] today with Dr Jayant Vasquez. Procedure reviewed & questions answered, voiced good understanding consent obtained & placed on chart. All medications and medical history reviewed. Will prep patient per orders. Patient & family updated on plan of care. The patient has a fraility score of 5-MILDLY FRAIL, based on use of wheelchair and assistance for ADLs. Pt took ASA 81mg x 4 and plavix 75mg at 0545.

## 2020-10-14 NOTE — PROCEDURES
[facility]  CARDIAC CATH    Name:  Yolie Suarez  MR#:  [id number]  :  1940  ACCOUNT #:  [de-identified]  DATE OF SERVICE:  10/14/2020    PRIMARY CARDIOLOGIST:  Cheryl Lam MD    PRIMARY CARE PHYSICIAN:  Arabella Sheehan MD    BRIEF HISTORY:  The patient is a very pleasant 79-year-old gentleman with history of mild dementia, established mild LV systolic dysfunction with low-flow, low-gradient severe aortic stenosis, who underwent TAVR CT scanning and now referred for coronary angiography. TAVR CT suggestive of appropriate anatomy for TAVR. Coronary imaging suggested a severe circumflex and LAD disease, referred for coronary angiography and possible intervention. PREOPERATIVE DIAGNOSES:  Severe aortic stenosis, established coronary artery disease. POSTOPERATIVE DIAGNOSES:  Severe aortic stenosis, established coronary artery disease. PROCEDURES PERFORMED:  1.  Bilateral selective coronary angiography. 2.  Bilateral selective iliofemoral runoff. SURGEON:  Sebastián Agrawal MD    ASSISTANT:  None. COMPLICATIONS:  None. ANESTHESIA:  Conscious sedation. Start time 12:30, end time 12:50. MEDICATIONS:  2 mg of Versed, 50 mcg of fentanyl. MONITORING RN:  Sonja Iverson RN. SPECIMENS REMOVED:  None. IMPLANTS:  None. ESTIMATED BLOOD LOSS:  Less than 5 mL. PROCEDURE:  After informed consent, he was prepped and draped in usual sterile fashion. The right groin was infiltrated with lidocaine. Right radial artery was accessed. A 6-Ecuadorean sheath was utilized. A 6-Ecuadorean Tiger catheter was utilized for left and right coronary injections and a 5-Ecuadorean long multipurpose catheter was utilized for bilateral selective iliofemoral runoff. CONTRAST:  Isovue. FINDINGS:  1. Left ventricle, not done. 2.  Left main:  Heavily calcified, trifurcates into LAD, ramus, and circumflex vessels.   Appears angiographically normal.  3.  Left anterior descending coronary: Severe concentric calcification proximally. There is a large mid vessel diagonal with an 80% ostial stenosis. Beyond the mid vessel diagonal, there is a long stent with severe 95% in-stent restenosis, then a fairly large distal and apical LAD beyond that, which appears normal.  4.  Ramus intermedius: It is a large vessel, appears angiographically normal.  5.  Left circumflex coronary: This is heavily calcified within the left main extending into the ostium. There is a 95% ostial stenosis with extensive eccentric calcification into the proximal portion of the vessel. This then moves forward with a fairly large obtuse marginal system. 6.  Right coronary: It is a large anatomically dominant vessel, is heavily calcified. It gives rise to a large posterior descending and large posterolateral branch. Entire system appears angiographically normal.  7.  Left lower extremity runoff: It is a large common iliac, external iliac, and femoral artery. The femoral artery bifurcates at the top of the femoral head making left femoral access not amenable to large-bore access for TAVR. 8.  Right lower extremity runoff:  Large, but calcified iliofemoral system, a large femoral artery, and low bifurcation, more appropriate for large-bore TAVR access. Successful hemostasis with pneumatic radial band. CONCLUSIONS:  1.  Mild LV systolic dysfunction with severe low-flow, low-grade aortic stenosis by echocardiogram.  2.  Severe diagonal ong-rd-doglvk LAD and ostial left circumflex disease, which may be very difficult to manage percutaneously. RECOMMENDATIONS:  The patient to be assessed for surgical risk. If the patient is deemed high surgical risk, high-risk PCI could be considered prior to TAVR. Thank you for allowing us to participate in the care of this patient. For any questions or concerns, please feel free to contact me.         Leslie Levy MD      [id number]  D:  10/14/2020 13:04  T: 10/14/2020 14:13  JOB #:  [id number]  CC:  MD Margaret Ortez MD

## 2020-10-14 NOTE — PROGRESS NOTES
Discharge instructions were reviewed with patient. An opportunity was given for questions. All medications were reviewed, and information was given on the new medications. Patient verbalized understanding, and has no questions at this time. Spoke with Dr. Harshil Anderson - received orders for patient to continue home dose of Plavix. Zafar Gonzalez (daughter) and pt verbalizes understanding.

## 2020-10-14 NOTE — PROGRESS NOTES
Radial compression band removed at 1415 after slowly reducing air from 9 cc to zero as per hospital protocol. No bleeding or hematoma noted. 2 x 2 gauze with tegaderm placed over puncture site. The affected extremity is warm and dry to the touch. Frequent vital signs documented per flowheet. Patient instructed to call if any bleeding noted on gauze. Patient verbalized understanding the nursing instructions.

## 2020-10-14 NOTE — PROGRESS NOTES
TRANSFER - OUT REPORT:    Verbal report given to cpru RN(name) on Kavitha Witt  being transferred to cpru(unit) for routine progression of care       Report consisted of patients Situation, Background, Assessment and   Recommendations(SBAR). Information from the following report(s) SBAR, Kardex, Procedure Summary and MAR was reviewed with the receiving nurse. Lines:   Peripheral IV 10/14/20 Posterior;Right Forearm (Active)       Peripheral IV 10/14/20 Left;Posterior Forearm (Active)        Opportunity for questions and clarification was provided. Patient transported with:   Registered Nurse          [address] [facility] with Dr. Servando Maldonado  Right radial access  No intervention. Multiple vessel disease. Surgical consult  Versed 2mg  Fentanyl 50mcg  TR Band in place @ 1255 with  9ml in the band.

## 2020-11-02 ENCOUNTER — TELEPHONE (OUTPATIENT)
Dept: CASE MANAGEMENT | Age: 80
End: 2020-11-02

## 2020-11-02 DIAGNOSIS — R06.02 SOB (SHORTNESS OF BREATH): Primary | ICD-10-CM

## 2020-11-02 DIAGNOSIS — I25.10 CORONARY ARTERY DISEASE INVOLVING NATIVE CORONARY ARTERY OF NATIVE HEART WITHOUT ANGINA PECTORIS: ICD-10-CM

## 2020-11-02 RX ORDER — CLOPIDOGREL BISULFATE 75 MG/1
75 TABLET ORAL DAILY
COMMUNITY
Start: 2020-10-14 | End: 2021-01-08

## 2020-11-02 NOTE — TELEPHONE ENCOUNTER
Patient instructions given for PCI with Dr Gardner Poster to daughter Ashli Joe. Scheduled for November 13 at 1100, arrival time 2 hr prior. Instructed to bring all home medications in labeled bottles on the day of procedure. NPO after midnight 11/13, except for medications. Patient informed to take Aspirin 81 mg x 4 on the day of procedure. Instructed they can take all other medications excluding vitamins & supplements 24 hr prior to procedure. Daughter verbalizes understanding of all instructions & denies any questions at this time. Informed that [employer] from cath lab will contact him with final details prior to [facility]. Contact info provided.      Joshua Chicas, Structural Heart Navigator

## 2020-11-06 ENCOUNTER — TELEPHONE (OUTPATIENT)
Dept: CASE MANAGEMENT | Age: 80
End: 2020-11-06

## 2020-11-06 NOTE — TELEPHONE ENCOUNTER
Spoke to daughter, per Dr. Pascual Saenz request, plan to cancel PCI and move forward with TAVR on 11/17 and plans for preassessment on 11/13. Arrival for preassessment will be 7:30 am at the outpatient center of the hospital and check in at the glass waiting room on 1st floor. Eat breakfast prior to arrival, bring medications in original bottles or a list with the dosages, and bring someone with you to the visit. Questions answered and contact info provided.     Dahiana Briggs, Structural Heart Navigator

## 2020-11-13 ENCOUNTER — HOSPITAL ENCOUNTER (EMERGENCY)
Age: 80
Discharge: HOME | End: 2020-11-13
Attending: INTERNAL MEDICINE
Payer: MEDICARE

## 2020-11-13 ENCOUNTER — APPOINTMENT (OUTPATIENT)
Dept: GENERAL RADIOLOGY | Age: 80
End: 2020-11-13
Attending: PHYSICIAN ASSISTANT
Payer: MEDICARE

## 2020-11-13 ENCOUNTER — HOSPITAL ENCOUNTER (OUTPATIENT)
Dept: SURGERY | Age: 80
Setting detail: OBSERVATION
Discharge: HOME | End: 2020-11-14
Attending: INTERNAL MEDICINE | Admitting: INTERNAL MEDICINE
Payer: MEDICARE

## 2020-11-13 ENCOUNTER — ANESTHESIA EVENT (OUTPATIENT)
Dept: SURGERY | Age: 80
DRG: 267 | End: 2020-11-13
Payer: MEDICARE

## 2020-11-13 VITALS
DIASTOLIC BLOOD PRESSURE: 73 MMHG | SYSTOLIC BLOOD PRESSURE: 120 MMHG | RESPIRATION RATE: 14 BRPM | TEMPERATURE: 97.8 F | OXYGEN SATURATION: 99 % | HEART RATE: 77 BPM

## 2020-11-13 DIAGNOSIS — I50.42 CHRONIC COMBINED SYSTOLIC AND DIASTOLIC CONGESTIVE HEART FAILURE (HCC): Chronic | ICD-10-CM

## 2020-11-13 DIAGNOSIS — I25.10 CORONARY ARTERY DISEASE INVOLVING NATIVE CORONARY ARTERY OF NATIVE HEART WITHOUT ANGINA PECTORIS: ICD-10-CM

## 2020-11-13 DIAGNOSIS — E78.00 PURE HYPERCHOLESTEROLEMIA: ICD-10-CM

## 2020-11-13 DIAGNOSIS — I35.0 NONRHEUMATIC AORTIC VALVE STENOSIS: Chronic | ICD-10-CM

## 2020-11-13 DIAGNOSIS — R55 SYNCOPE AND COLLAPSE: ICD-10-CM

## 2020-11-13 PROBLEM — R30.0 DYSURIA: Status: ACTIVE | Noted: 2020-11-13

## 2020-11-13 LAB
ABO + RH BLD: NORMAL
ALBUMIN SERPL-MCNC: 3.7 G/DL (ref 3.2–4.6)
ALBUMIN/GLOB SERPL: 1.1 {RATIO} (ref 1.2–3.5)
ALP SERPL-CCNC: 95 U/L (ref 50–136)
ALT SERPL-CCNC: 16 U/L (ref 12–65)
ANION GAP SERPL CALC-SCNC: 11 MMOL/L (ref 7–16)
APPEARANCE UR: CLEAR
AST SERPL-CCNC: 26 U/L (ref 15–37)
ATRIAL RATE: 69 BPM
BACTERIA SPEC CULT: NORMAL
BACTERIA URNS QL MICRO: 0 /HPF
BILIRUB SERPL-MCNC: 0.3 MG/DL (ref 0.2–1.1)
BILIRUB UR QL: NEGATIVE
BLOOD GROUP ANTIBODIES SERPL: NORMAL
BNP SERPL-MCNC: 729 PG/ML
BUN SERPL-MCNC: 18 MG/DL (ref 8–23)
CALCIUM SERPL-MCNC: 9.4 MG/DL (ref 8.3–10.4)
CALCULATED P AXIS, ECG09: 32 DEGREES
CALCULATED T AXIS, ECG11: -131 DEGREES
CASTS URNS QL MICRO: ABNORMAL /LPF
CHLORIDE SERPL-SCNC: 106 MMOL/L (ref 98–107)
CO2 SERPL-SCNC: 20 MMOL/L (ref 21–32)
COLOR UR: YELLOW
COVID-19 RAPID TEST, COVR: NOT DETECTED
CREAT SERPL-MCNC: 1.14 MG/DL (ref 0.8–1.5)
DIAGNOSIS, 93000: NORMAL
EPI CELLS #/AREA URNS HPF: ABNORMAL /HPF
ERYTHROCYTE [DISTWIDTH] IN BLOOD BY AUTOMATED COUNT: 14.6 % (ref 11.9–14.6)
EST. AVERAGE GLUCOSE BLD GHB EST-MCNC: 140 MG/DL
GLOBULIN SER CALC-MCNC: 3.5 G/DL (ref 2.3–3.5)
GLUCOSE SERPL-MCNC: 112 MG/DL (ref 65–100)
GLUCOSE UR STRIP.AUTO-MCNC: NEGATIVE MG/DL
HBA1C MFR BLD: 6.5 % (ref 4.8–6)
HCT VFR BLD AUTO: 39.3 % (ref 41.1–50.3)
HGB BLD-MCNC: 13.1 G/DL (ref 13.6–17.2)
HGB UR QL STRIP: NEGATIVE
INR PPP: 1
KETONES UR QL STRIP.AUTO: NEGATIVE MG/DL
LEUKOCYTE ESTERASE UR QL STRIP.AUTO: ABNORMAL
MAGNESIUM SERPL-MCNC: 2.2 MG/DL (ref 1.8–2.4)
MCH RBC QN AUTO: 30.7 PG (ref 26.1–32.9)
MCHC RBC AUTO-ENTMCNC: 33.3 G/DL (ref 31.4–35)
MCV RBC AUTO: 92 FL (ref 79.6–97.8)
NITRITE UR QL STRIP.AUTO: NEGATIVE
NRBC # BLD: 0 K/UL (ref 0–0.2)
P-R INTERVAL, ECG05: 230 MS
PH UR STRIP: 5.5 [PH] (ref 5–9)
PLATELET # BLD AUTO: 240 K/UL (ref 150–450)
PMV BLD AUTO: 9.3 FL (ref 9.4–12.3)
POTASSIUM SERPL-SCNC: 4.3 MMOL/L (ref 3.5–5.1)
PROT SERPL-MCNC: 7.2 G/DL (ref 6.3–8.2)
PROT UR STRIP-MCNC: NEGATIVE MG/DL
PROTHROMBIN TIME: 13.3 SEC (ref 12.5–14.7)
Q-T INTERVAL, ECG07: 362 MS
QRS DURATION, ECG06: 86 MS
QTC CALCULATION (BEZET), ECG08: 387 MS
RBC # BLD AUTO: 4.27 M/UL (ref 4.23–5.6)
RBC #/AREA URNS HPF: ABNORMAL /HPF
SARS COV-2, XPGCVT: NEGATIVE
SERVICE CMNT-IMP: NORMAL
SODIUM SERPL-SCNC: 137 MMOL/L (ref 138–145)
SOURCE, COVRS: NORMAL
SP GR UR REFRACTOMETRY: 1.01 (ref 1–1.02)
SPECIMEN EXP DATE BLD: NORMAL
UROBILINOGEN UR QL STRIP.AUTO: 0.2 EU/DL (ref 0.2–1)
VENTRICULAR RATE, ECG03: 69 BPM
WBC # BLD AUTO: 12.2 K/UL (ref 4.3–11.1)
WBC URNS QL MICRO: ABNORMAL /HPF

## 2020-11-13 PROCEDURE — 87641 MR-STAPH DNA AMP PROBE: CPT

## 2020-11-13 PROCEDURE — 2709999900 HC NON-CHARGEABLE SUPPLY

## 2020-11-13 PROCEDURE — 85027 COMPLETE CBC AUTOMATED: CPT

## 2020-11-13 PROCEDURE — 83880 ASSAY OF NATRIURETIC PEPTIDE: CPT

## 2020-11-13 PROCEDURE — 74011250637 HC RX REV CODE- 250/637: Performed by: NURSE PRACTITIONER

## 2020-11-13 PROCEDURE — 80053 COMPREHEN METABOLIC PANEL: CPT

## 2020-11-13 PROCEDURE — 36415 COLL VENOUS BLD VENIPUNCTURE: CPT

## 2020-11-13 PROCEDURE — 87086 URINE CULTURE/COLONY COUNT: CPT

## 2020-11-13 PROCEDURE — 87635 SARS-COV-2 COVID-19 AMP PRB: CPT

## 2020-11-13 PROCEDURE — 80048 BASIC METABOLIC PNL TOTAL CA: CPT

## 2020-11-13 PROCEDURE — 93005 ELECTROCARDIOGRAM TRACING: CPT

## 2020-11-13 PROCEDURE — 81001 URINALYSIS AUTO W/SCOPE: CPT

## 2020-11-13 PROCEDURE — C8924 2D TTE W OR W/O FOL W/CON,FU: HCPCS

## 2020-11-13 PROCEDURE — 74011000250 HC RX REV CODE- 250: Performed by: INTERNAL MEDICINE

## 2020-11-13 PROCEDURE — 71046 X-RAY EXAM CHEST 2 VIEWS: CPT

## 2020-11-13 PROCEDURE — 99218 HC RM OBSERVATION: CPT

## 2020-11-13 PROCEDURE — 83036 HEMOGLOBIN GLYCOSYLATED A1C: CPT

## 2020-11-13 PROCEDURE — 86900 BLOOD TYPING SEROLOGIC ABO: CPT

## 2020-11-13 PROCEDURE — 85610 PROTHROMBIN TIME: CPT

## 2020-11-13 PROCEDURE — 99220 PR INITIAL OBSERVATION CARE/DAY 70 MINUTES: CPT | Performed by: INTERNAL MEDICINE

## 2020-11-13 PROCEDURE — 83735 ASSAY OF MAGNESIUM: CPT

## 2020-11-13 PROCEDURE — 74011250636 HC RX REV CODE- 250/636: Performed by: INTERNAL MEDICINE

## 2020-11-13 PROCEDURE — 99283 EMERGENCY DEPT VISIT LOW MDM: CPT

## 2020-11-13 RX ORDER — CLOPIDOGREL BISULFATE 75 MG/1
75 TABLET ORAL DAILY
Status: DISCONTINUED | OUTPATIENT
Start: 2020-11-13 | End: 2020-11-14 | Stop reason: HOSPADM

## 2020-11-13 RX ORDER — NITROGLYCERIN 0.4 MG/1
0.4 TABLET SUBLINGUAL
Status: DISCONTINUED | OUTPATIENT
Start: 2020-11-13 | End: 2020-11-14 | Stop reason: HOSPADM

## 2020-11-13 RX ORDER — ASPIRIN 81 MG/1
81 TABLET ORAL DAILY
Status: DISCONTINUED | OUTPATIENT
Start: 2020-11-13 | End: 2020-11-14 | Stop reason: HOSPADM

## 2020-11-13 RX ORDER — ROSUVASTATIN CALCIUM 20 MG/1
20 TABLET, COATED ORAL
Status: DISCONTINUED | OUTPATIENT
Start: 2020-11-13 | End: 2020-11-13 | Stop reason: HOSPADM

## 2020-11-13 RX ORDER — MORPHINE SULFATE 2 MG/ML
2 INJECTION, SOLUTION INTRAMUSCULAR; INTRAVENOUS
Status: DISCONTINUED | OUTPATIENT
Start: 2020-11-13 | End: 2020-11-14 | Stop reason: HOSPADM

## 2020-11-13 RX ORDER — SODIUM CHLORIDE 0.9 % (FLUSH) 0.9 %
5-40 SYRINGE (ML) INJECTION AS NEEDED
Status: DISCONTINUED | OUTPATIENT
Start: 2020-11-13 | End: 2020-11-14 | Stop reason: HOSPADM

## 2020-11-13 RX ORDER — ACETAMINOPHEN 500 MG
1000 TABLET ORAL
Status: DISCONTINUED | OUTPATIENT
Start: 2020-11-13 | End: 2020-11-14 | Stop reason: HOSPADM

## 2020-11-13 RX ORDER — SODIUM CHLORIDE 0.9 % (FLUSH) 0.9 %
5-40 SYRINGE (ML) INJECTION EVERY 8 HOURS
Status: DISCONTINUED | OUTPATIENT
Start: 2020-11-13 | End: 2020-11-14 | Stop reason: HOSPADM

## 2020-11-13 RX ORDER — NYSTATIN 100000 U/G
CREAM TOPICAL 2 TIMES DAILY
Status: DISCONTINUED | OUTPATIENT
Start: 2020-11-13 | End: 2020-11-14 | Stop reason: HOSPADM

## 2020-11-13 RX ORDER — SAME BUTANEDISULFONATE/BETAINE 400-600 MG
250 POWDER IN PACKET (EA) ORAL DAILY
Status: DISCONTINUED | OUTPATIENT
Start: 2020-11-13 | End: 2020-11-14 | Stop reason: HOSPADM

## 2020-11-13 RX ORDER — TAMSULOSIN HYDROCHLORIDE 0.4 MG/1
0.4 CAPSULE ORAL DAILY
Status: DISCONTINUED | OUTPATIENT
Start: 2020-11-13 | End: 2020-11-14 | Stop reason: HOSPADM

## 2020-11-13 RX ORDER — CARVEDILOL 6.25 MG/1
6.25 TABLET ORAL 2 TIMES DAILY WITH MEALS
Status: DISCONTINUED | OUTPATIENT
Start: 2020-11-13 | End: 2020-11-14 | Stop reason: HOSPADM

## 2020-11-13 RX ADMIN — NYSTATIN: 100000 CREAM TOPICAL at 18:00

## 2020-11-13 RX ADMIN — Medication 10 ML: at 22:23

## 2020-11-13 RX ADMIN — PERFLUTREN 1 ML: 6.52 INJECTION, SUSPENSION INTRAVENOUS at 13:45

## 2020-11-13 RX ADMIN — CARVEDILOL 6.25 MG: 6.25 TABLET, FILM COATED ORAL at 17:23

## 2020-11-13 NOTE — PERIOP NOTES
PLEASE CONTINUE TAKING ALL PRESCRIPTION MEDICATIONS UP TO THE DAY OF SURGERY UNLESS OTHERWISE DIRECTED BELOW. DISCONTINUE all vitamins, VITAMIN D and supplements 7days prior to surgery. DISCONTINUE Non-Steriodal Anti-Inflammatory (NSAIDS) such as Advil and Aleve 5 days prior to surgery. Home Medications to take  the day of surgery      PLAVIX    ASPIRIN    CARVEDILOL (COREG)     TAMSULOSIN (FLOMAX)        Home Medications   to Hold     HOLD LISINOPRIL DAY OF SURGERY     HOLD PROBIOTIC  DAY OF SURGERY     Comments      HEVER WILL CALL DAUGHTER CONCERNING CARVEDILOL     PRESCRIPTION     RETURN TO OUTPATIENT ON 11/16/2020 TO HAVE BLOOD WORK     DRAWN. PLEASE DO NOT REMOVE GREEN BRACELET   *Visitor policy of 1 visitor per patient discussed. Please do not bring home medications with you on the day of surgery unless otherwise directed by your nurse. If you are instructed to bring home medications, please give them to your nurse as they will be administered by the nursing staff. If you have any questions, please call [facility] [phone] or [facility] [phone]. A copy of this note was provided to the patient for reference.

## 2020-11-13 NOTE — ROUTINE PROCESS
TRANSFER - OUT REPORT: 
 
Verbal report given to Sugey Dhaliwal on Irene Esquivel  being transferred to 3rd floor- Tele for routine progression of care Report consisted of patients Situation, Background, Assessment and  
Recommendations(SBAR). Information from the following report(s) ED Summary was reviewed with the receiving nurse. Lines:    
 
Opportunity for questions and clarification was provided. Patient transported with: 
 Monitor Registered Nurse

## 2020-11-13 NOTE — ACP (ADVANCE CARE PLANNING)
Advance Care Planning     Advance Care Planning Activator (Inpatient)  Conversation Note      Date of ACP Conversation: 11/13/20     Conversation Conducted with:   Patient with Decision Making Capacity    ACP Activator: Ike makes decisions on behalf of the incapacitated patient: Decision Maker is asked to consider and make decisions based on patient values, known preferences, or best interests. Health Care Decision Maker:    Current Designated Health Care Decision Maker:   Primary Decision Maker: Kari Loja - [phone]    Secondary Decision Maker: Karyle Griffon - [phone]  (If there is a [address] named in the [address] [facility] Makers\" box in the ACP activity, but it is not visible above, be sure to open that field and then select the health care decision maker relationship (ie \"primary\") in the blank space to the right of the name.) Validate  this information as still accurate & up-to-date; edit [address] field as needed.)    Note: Assess and validate information in current ACP documents, as indicated. NOTE: If the patient has a valid advance directive AND now provides care preference(s) that are inconsistent with that prior directive, advise the patient to consider either: creating a new advance directive that complies with state-specific requirements; or, if that is not possible, orally revoking that prior directive in accordance with state-specific requirements, which must be documented in the EHR. [] Yes  [] No   Educated Patient / Paras Dye regarding differences between Advance Directives and portable DNR orders.     Length of ACP Conversation in minutes:      Conversation Outcomes:  [] ACP discussion completed  [] Existing advance directive reviewed with patient; no changes to patient's previously recorded wishes     [] New Advance Directive completed   [] Portable Do Not Resuscitate prepared for Provider review and signature  [] POLST/POST/MOLST/MOST prepared for Provider review and signature      Follow-up plan:    [] Schedule follow-up conversation to continue planning  [] Referred individual to Provider for additional questions/concerns   [] Advised patient/agent/surrogate to review completed ACP document and update if needed with changes in condition, patient preferences or care setting     Patient states that he only wants to list Healthcare Decision Maker above. Declined additional questions. Patient states that he will bring document from home to scan in chart. [x] This note routed to one or more involved healthcare providers    These are patient's current wishes as discussed at bedside today and are not intended to take place of [address]. SW-CM wore appropriate PPE and goggles during discussion with patient.

## 2020-11-13 NOTE — PERIOP NOTES
Called and spoke Meghana Salazar concerning patient medication Lisinopril and Carvedilol. Meghana Salazar states will call daughter and instructed her on medication.  Informed daughter and daughter verbalized understanding

## 2020-11-13 NOTE — H&P
[facility] CARDIOLOGY History &Physical                 Primary Cardiologist: Dr Leonarda Feldman    Primary Care Physician: Lazara Ferrer MD    Admitting Physician:  Dr Chaka Arnold:     Patient is a 79 y.o. male with a hx of AS scheduled for TAVR, CAD, GERD, reduced EF 45-45%, seizures, Vertigo, and syncope with collapse. The patient was in pre assessment for his scheduled TAVR on tues when he started to yawn and had a syncope episode. The patient was sitting in his wheel chair for over 30 min with no sudden position changes. His episode was described as a quick syncope episode and was placed on a stretcher. A Rapid response was called with EKG obtained. VS showed 108/71 Os sat of 98% and no HR was documented at this time. The patient EKG shows diffuse T wave inversions with no complaints of SOB or CP at this time. Per the patients family his last episode like this happened about 3 months ago and he always quickly wakes up. He has no other complaints at this time except some dysuria but no complaints of fever. The patient was transported to the ED due to lack of hospital beds at this time for further evaluation and admission for observation. Recent Cardiac Synopsis w/ Labs  LHC/NST: 10/14/2020 1. Mild LV systolic dysfunction with severe low-flow, low-grade aortic stenosis by echocardiogram.  2.  Severe diagonal [id number] LAD and ostial left circumflex disease, which may be very difficult to manage percutaneously.  RECOMMENDATIONS:  The patient to be assessed for surgical risk. If the patient is deemed high surgical risk, high-risk PCI could be considered prior to TAVR. Echo:  8/18/2020      EKG: NSR with diffuse T Wave inversions new from EKG in Oct  PPM Interrogation:     Past Medical History:   Diagnosis Date    Aortic stenosis     Arthritis     generalized osteo.     CAD (coronary artery disease)     6/16/2016-had CP- stent X 1- no MI     Claustrophobia     GERD (gastroesophageal reflux disease)     probable symptoms daughter reports that lives with pt    Heart failure ([facility] [address]     Echo 08/18/20 EF 45-50%    Ill-defined condition     orthostatic hypotension    Sarcoidosis of lymph nodes     in remission    Seizures (HCC)     Dr Angeles Mojica (cardio) said might have had TIA- questionable if he had a seizure    Shortness of breath     Syncope and collapse     Vertigo     occassional      Past Surgical History:   Procedure Laterality Date    CARDIAC SURG PROCEDURE UNLIST      1 stent 6/16/16 in LAD    HX CATARACT REMOVAL Bilateral     HX COLONOSCOPY      HX KNEE ARTHROSCOPY      left knee    HX LAP CHOLECYSTECTOMY      HX LYMPH NODE DISSECTION      Sarcoid in remission    HX SHOULDER ARTHROSCOPY      right side     HX UROLOGICAL  2015    TURP      Allergies   Allergen Reactions    Bactrim [Sulfamethoprim Ds] Other (comments)     Oliguria    Keppra [Levetiracetam] Other (comments)     Made legs weak    Levaquin [Levofloxacin] Diarrhea    Other Medication Other (comments)     \"Probanthene and Dartal\"? States stomach medication. Numbness and shakiness. \"Made him feel he was having a heart attack\"  \"Some medication he got in the \"     Social History     Tobacco Use    Smoking status: Former Smoker    Smokeless tobacco: Former User    Tobacco comment: quit over 20 years ago   Substance Use Topics    Alcohol use: No      FH:   Family History   Problem Relation Age of Onset    Heart Disease Mother 68        MI    Diabetes Mother         borderline     Hypertension Mother     Cancer Father     Stroke Brother     Coronary Artery Disease Other         Family History        Review of Systems   Constitution: Negative for diaphoresis, fever, malaise/fatigue, weight gain and weight loss. HENT: Negative. Eyes: Negative. Cardiovascular: Positive for syncope.  Negative for chest pain (currently pain free), claudication, cyanosis, dyspnea on exertion, irregular heartbeat, leg swelling, near-syncope, orthopnea, palpitations and paroxysmal nocturnal dyspnea. Respiratory: Negative for cough, shortness of breath and wheezing. Endocrine: Negative. Skin: Negative. Musculoskeletal: Negative. Gastrointestinal: Negative for nausea and vomiting. Genitourinary: Positive for dysuria and urgency. Neurological: Negative for dizziness. Psychiatric/Behavioral: Negative. Allergic/Immunologic: Negative. Objective:       Visit Vitals  /71   Pulse 75   Temp 97.8 °F (36.6 °C)   Resp 18   SpO2 96%       There were no vitals filed for this visit. No intake/output data recorded. No intake/output data recorded. Physical Exam:  General: Well Developed, Well Nourished, No Acute Distress  HEENT: pupils equal and round, no abnormalities noted  Neck: supple, no JVD, no carotid bruits  Heart: S1S2 with RRR without Aortic 3/6 Murmur  Lungs: Clear throughout auscultation bilaterally without adventitious sounds  Abd: soft, nontender, nondistended, with good bowel sounds  Ext: warm, no edema, calves supple/nontender, pulses 2+ bilaterally  Skin: warm and dry  Psychiatric: Normal mood and affect  Neurologic: Alert and oriented X 3        Data Review:   Recent Labs     11/13/20  0907   *   K 4.3   MG 2.2   BUN 18   CREA 1.14   *   WBC 12.2*   HGB 13.1*   HCT 39.3*      INR 1.0   BNPNT 729*         CXR Results  (Last 48 hours)               11/13/20 0953  XR CHEST PA LAT Final result    Impression:  IMPRESSION:       1. Unchanged enlargement of the cardiac silhouette. 2. Low lung volumes with bibasilar atelectasis. Increased pulmonary vascularity.        VOICE DICTATED BY: Dr. Nova De Leon       Narrative:  EXAMINATION: CHEST RADIOGRAPH 11/13/2020 9:53 AM       ACCESSION NUMBER: [id number]       INDICATION: for TAVR       COMPARISON: CT angiogram chest abdomen and pelvis 10/7/2020, chest x-ray   9/24/2019, 1/31/2017 TECHNIQUE: PA  and lateral views of the chest were obtained. FINDINGS:        Cardiac Silhouette: Unchanged enlargement of the cardiac silhouette. Lungs: Low lung volumes with bibasilar atelectasis. Increased pulmonary   vascularity. Pleura: No pleural effusion. No pneumothorax. Osseous Structures: Thoracic spine spondylosis. Upper Abdomen: Right upper quadrant surgical clips. Assessment/Plan:   Principal Problem:    Syncope and collapse (11/13/2020) Pt has no complaints. NSR, labs pending, update Echo, monitor on Tele. Will continue to monitor the patient for changes. Continue home medications now except Lisinopril. Pt has not been on lisinopril or BB due to not having Rx. Last episode grater than 2 months ago. Further recommendations pending clinical course and attending recommendations. Active Problems:    Pure hypercholesterolemia (6/27/2016) continue statin      Nonrheumatic aortic valve stenosis (9/11/2020) Can finish pre evaluation while in observation. Dysuria (11/13/2020) Check UA, continue to monitor. Further recommendations pending clinical course and attending recommendations. CAD: Currently medically managed, continue ASA, BB, Plavix, consider adding statin. Defaulted to primary cardiologist seeing patient today.     Martina Torres NP  11/13/2020  9:58 AM

## 2020-11-13 NOTE — ED TRIAGE NOTES
Patient to ed from pre-assessment for direct admission after having a syncopal episode in their department when presenting for assessment for TAVR scheduled later this week.

## 2020-11-13 NOTE — PERIOP NOTES
Dr Home Ha in to see patient and patient became flaccid and unresponsive Rapid Response called and patient placed on stretcher and oxygen 2l per nc began.  Iv Started x 2 blood work drawn and EKG obtained

## 2020-11-13 NOTE — ANESTHESIA PREPROCEDURE EVALUATION
Anesthetic History               Review of Systems / Medical History  Patient summary reviewed and pertinent labs reviewed    Pulmonary          Shortness of breath      Comments: Remote h/o sarcoidosis. Has \"scar tissue\" per daughter. Neuro/Psych         TIA (questionable) and dementia (Mild)     Cardiovascular              CAD and cardiac stents (x1 - '16)    Exercise tolerance: <4 METS: He is wheelchair bound and requires total assistance out of wheelchair. Comments: Orthostatic hypotension    Cath 10/14/20 showed  1. Mild LV systolic dysfunction  2. Severe diagonal [id number] LAD (95%) and ostial left circumflex (95) disease    Echo showed LVEF 45-50%, mod to severe AS, mild to mod MR, RVSP 47mmHg   GI/Hepatic/Renal     GERD           Endo/Other        Arthritis     Other Findings            Physical Exam    Airway  Mallampati: III  TM Distance: 4 - 6 cm  Neck ROM: normal range of motion   Mouth opening: Normal     Cardiovascular    Rhythm: regular  Rate: normal    Murmur: Grade 4, Aortic area     Dental    Dentition: Upper partial plate     Pulmonary  Breath sounds clear to auscultation               Abdominal         Other Findings            Anesthetic Plan    ASA: 4  Anesthesia type: total IV anesthesia    Monitoring Plan: Arterial line      Induction: Intravenous  Anesthetic plan and risks discussed with: Patient and Son / Daughter      Patient very high risk - severe CAD but any type of PCI was considered high risk. Evaluated for CABG/AVR, but deemed to high risk given functional status, so consesus was to proceed with TAVR given his recurrent syncope symptoms and hospitalizations. Had syncopal episode during PAT evaluation and rapid response called. Patient was put supine in a stretcher and VS, EKG, labs obtained and PIV access established. By the time help arrived syncopal episode had resolved. Per daughter, these episodes have been the reason for recent frequent ER visits.   Will d/w Cardiologist and TAVR coordinator. Patient may require inpatient hospitalization until procedure on Tues, but unsure if there are any hospital beds available.

## 2020-11-13 NOTE — PROGRESS NOTES
Rapid response was in process upon my arrival to Pre-assessment. PFT held at this time due to patient condition.

## 2020-11-14 VITALS
HEIGHT: 72 IN | DIASTOLIC BLOOD PRESSURE: 77 MMHG | HEART RATE: 78 BPM | SYSTOLIC BLOOD PRESSURE: 123 MMHG | TEMPERATURE: 98 F | OXYGEN SATURATION: 96 % | BODY MASS INDEX: 28.64 KG/M2 | RESPIRATION RATE: 18 BRPM | WEIGHT: 211.42 LBS

## 2020-11-14 LAB
ANION GAP SERPL CALC-SCNC: 3 MMOL/L (ref 7–16)
BUN SERPL-MCNC: 14 MG/DL (ref 8–23)
CALCIUM SERPL-MCNC: 8.6 MG/DL (ref 8.3–10.4)
CHLORIDE SERPL-SCNC: 108 MMOL/L (ref 98–107)
CO2 SERPL-SCNC: 29 MMOL/L (ref 21–32)
CREAT SERPL-MCNC: 0.88 MG/DL (ref 0.8–1.5)
ERYTHROCYTE [DISTWIDTH] IN BLOOD BY AUTOMATED COUNT: 14.6 % (ref 11.9–14.6)
GLUCOSE SERPL-MCNC: 94 MG/DL (ref 65–100)
HCT VFR BLD AUTO: 36.2 % (ref 41.1–50.3)
HGB BLD-MCNC: 11.7 G/DL (ref 13.6–17.2)
MCH RBC QN AUTO: 29.8 PG (ref 26.1–32.9)
MCHC RBC AUTO-ENTMCNC: 32.3 G/DL (ref 31.4–35)
MCV RBC AUTO: 92.3 FL (ref 79.6–97.8)
NRBC # BLD: 0 K/UL (ref 0–0.2)
PLATELET # BLD AUTO: 194 K/UL (ref 150–450)
PMV BLD AUTO: 9.2 FL (ref 9.4–12.3)
POTASSIUM SERPL-SCNC: 4 MMOL/L (ref 3.5–5.1)
RBC # BLD AUTO: 3.92 M/UL (ref 4.23–5.6)
SODIUM SERPL-SCNC: 140 MMOL/L (ref 136–145)
WBC # BLD AUTO: 6.5 K/UL (ref 4.3–11.1)

## 2020-11-14 PROCEDURE — 90686 IIV4 VACC NO PRSV 0.5 ML IM: CPT | Performed by: INTERNAL MEDICINE

## 2020-11-14 PROCEDURE — 74011250637 HC RX REV CODE- 250/637: Performed by: NURSE PRACTITIONER

## 2020-11-14 PROCEDURE — 77030040393 HC DRSG OPTIFOAM GENT MDII -B

## 2020-11-14 PROCEDURE — 99217 PR OBSERVATION CARE DISCHARGE MANAGEMENT: CPT | Performed by: INTERNAL MEDICINE

## 2020-11-14 PROCEDURE — 93005 ELECTROCARDIOGRAM TRACING: CPT

## 2020-11-14 PROCEDURE — 99218 HC RM OBSERVATION: CPT

## 2020-11-14 PROCEDURE — 74011250636 HC RX REV CODE- 250/636: Performed by: INTERNAL MEDICINE

## 2020-11-14 PROCEDURE — 77030010545

## 2020-11-14 PROCEDURE — 90471 IMMUNIZATION ADMIN: CPT

## 2020-11-14 PROCEDURE — 77030040829 HC CATH EXT URINE MDII -B

## 2020-11-14 RX ORDER — CARVEDILOL 6.25 MG/1
6.25 TABLET ORAL 2 TIMES DAILY WITH MEALS
Qty: 60 TAB | Refills: 3 | Status: SHIPPED | OUTPATIENT
Start: 2020-11-14 | End: 2021-01-01 | Stop reason: SDUPTHER

## 2020-11-14 RX ADMIN — CLOPIDOGREL BISULFATE 75 MG: 75 TABLET ORAL at 09:39

## 2020-11-14 RX ADMIN — TAMSULOSIN HYDROCHLORIDE 0.4 MG: 0.4 CAPSULE ORAL at 09:39

## 2020-11-14 RX ADMIN — INFLUENZA VIRUS VACCINE 0.5 ML: 15; 15; 15; 15 SUSPENSION INTRAMUSCULAR at 11:51

## 2020-11-14 RX ADMIN — Medication 10 ML: at 05:35

## 2020-11-14 RX ADMIN — RDII 250 MG CAPSULE 250 MG: at 09:39

## 2020-11-14 RX ADMIN — ASPIRIN 81 MG: 81 TABLET ORAL at 09:39

## 2020-11-14 RX ADMIN — CARVEDILOL 6.25 MG: 6.25 TABLET, FILM COATED ORAL at 09:39

## 2020-11-14 NOTE — PROGRESS NOTES
Pt is for discharge home today with no needs/supportive care orders recieved for CM at this time. Care Management Interventions  PCP Verified by CM: (Winsome Pinzon MD)  Mode of Transport at Discharge: Other (see comment)(family)  Transition of Care Consult (CM Consult): Discharge Planning(Pt is insured by medicare with a medicare supplement and pharmacy benefits.  )  Discharge Durable Medical Equipment: No  Physical Therapy Consult: No  Occupational Therapy Consult: No  Speech Therapy Consult: No  Current Support Network: Own Home, Lives with Caregiver(Pt is . He resides with his daughter, Isaiah Rodriguez [phone] and her daug.   They are supportive of pt as needed.    )  Confirm Follow Up Transport: Family  Name of the Patient Representative Who was Provided with a Choice of Provider and Agrees with the Discharge Plan: pt  [employer] Information Provided?: No  Discharge Location  Discharge Placement: Home

## 2020-11-14 NOTE — ROUTINE PROCESS
Bedside and Verbal shift change report given to self (oncoming nurse) by Yaz Winston RN (offgoing nurse).  Report included the following information SBAR, Kardex, ED Summary, Procedure Summary, Intake/Output, MAR, Recent Results

## 2020-11-14 NOTE — DISCHARGE SUMMARY
[facility] Cardiology Discharge Summary     Patient ID:  Federico Barahona  [id number]  22 y.o.  1940    Admit date: 11/13/2020    Discharge date:  11/14/2020    Admitting Physician: Volodymyr Gillette MD     Discharge Physician: Dr. Richard Ng    Admission Diagnoses: Syncope [R55]    Discharge Diagnoses:    Diagnosis    Syncope and collapse    Dysuria    Nonrheumatic aortic valve stenosis    Chronic combined systolic and diastolic congestive heart failure     Chronic low back pain    Pure hypercholesterolemia    Orthostatic hypotension    Seizure disorder     Coronary artery disease involving native coronary artery of native heart without angina pectoris       Cardiology Procedures this admission:  EchoCardiogram  Consults: None    Hospital Course: Patient presented to Pre-Assessment today for pre-TAVR w/u. During the evaluation he experienced a syncopal event and a Rapid Response was called. Pt was taken to the ED for evaluation. He was seen by Dr. Malick Mitchell and admitted for observation. He underwent ECHO (see below). ECHO 11/13/20  -  Left ventricle: Systolic function was normal. Ejection fraction was  estimated in the range of 40 % to 45 %. There was hypokinesis of the   basal-mid inferolateral wall(s). -  Aortic valve: The valve was probably trileaflet. Leaflets exhibited marked  calcification and reduced mobility. There was moderate to severe stenosis,   VTI ratio 0.26. The peak velocity was 3.23 m/s.     The following morning patient was up feeling well without any further syncopal events. Patient's labs were stable. Patient was seen and examined by Dr. Richard Ng and determined stable and ready for discharge. Patient was instructed on the importance of medication compliance and will follow-up as directed for TAVR and will continue with planned TAVR on Tues.       DISPOSITION: The patient is being discharged home in stable condition on a low saturated fat, low cholesterol and low salt diet. The patient is instructed to advance activities as tolerated to the limit of fatigue or shortness of breath. The patient is instructed to call the office or return to the ER for immediate evaluation for any shortness of breath or chest pain. Discharge Exam:   Visit Vitals  BP (!) 145/80 (BP 1 Location: Right arm, BP Patient Position: At rest)   Pulse 72   Temp 97.5 °F (36.4 °C)   Resp 16   Ht 6' (1.829 m)   Wt 95.9 kg (211 lb 6.7 oz)   SpO2 93%   BMI 28.67 kg/m²       Patient has been seen by Dr. Lela Barroso: see his progress note for exam details. Recent Results (from the past 24 hour(s))   MSSA/MRSA SC BY PCR, NASAL SWAB    Collection Time: 11/13/20  7:54 AM    Specimen: Nasal    SWAB   Result Value Ref Range    Special Requests: NO SPECIAL REQUESTS      Culture result:        SA target not detected. A MRSA NEGATIVE, SA NEGATIVE test result does not preclude MRSA or SA nasal colonization.    SARS-COV-2    Collection Time: 11/13/20  8:33 AM   Result Value Ref Range    Specimen source Nasopharyngeal      COVID-19 rapid test Not detected NOTD      SARS CoV-2 Negative Negative     CBC W/O DIFF    Collection Time: 11/13/20  9:07 AM   Result Value Ref Range    WBC 12.2 (H) 4.3 - 11.1 K/uL    RBC 4.27 4.23 - 5.6 M/uL    HGB 13.1 (L) 13.6 - 17.2 g/dL    HCT 39.3 (L) 41.1 - 50.3 %    MCV 92.0 79.6 - 97.8 FL    MCH 30.7 26.1 - 32.9 PG    MCHC 33.3 31.4 - 35.0 g/dL    RDW 14.6 11.9 - 14.6 %    PLATELET 778 118 - 287 K/uL    MPV 9.3 (L) 9.4 - 12.3 FL    ABSOLUTE NRBC 0.00 0.0 - 0.2 K/uL   METABOLIC PANEL, COMPREHENSIVE    Collection Time: 11/13/20  9:07 AM   Result Value Ref Range    Sodium 137 (L) 138 - 145 mmol/L    Potassium 4.3 3.5 - 5.1 mmol/L    Chloride 106 98 - 107 mmol/L    CO2 20 (L) 21 - 32 mmol/L    Anion gap 11 7 - 16 mmol/L    Glucose 112 (H) 65 - 100 mg/dL    BUN 18 8 - 23 MG/DL    Creatinine 1.14 0.8 - 1.5 MG/DL    GFR est AA >60 >60 ml/min/1.73m2    GFR est non-AA >60 >60 ml/min/1.73m2    Calcium 9.4 8.3 - 10.4 MG/DL    Bilirubin, total 0.3 0.2 - 1.1 MG/DL    ALT (SGPT) 16 12 - 65 U/L    AST (SGOT) 26 15 - 37 U/L    Alk.  phosphatase 95 50 - 136 U/L    Protein, total 7.2 6.3 - 8.2 g/dL    Albumin 3.7 3.2 - 4.6 g/dL    Globulin 3.5 2.3 - 3.5 g/dL    A-G Ratio 1.1 (L) 1.2 - 3.5     PROTHROMBIN TIME + INR    Collection Time: 11/13/20  9:07 AM   Result Value Ref Range    Prothrombin time 13.3 12.5 - 14.7 sec    INR 1.0     HEMOGLOBIN A1C WITH EAG    Collection Time: 11/13/20  9:07 AM   Result Value Ref Range    Hemoglobin A1c 6.5 (H) 4.8 - 6.0 %    Est. average glucose 140 mg/dL   MAGNESIUM    Collection Time: 11/13/20  9:07 AM   Result Value Ref Range    Magnesium 2.2 1.8 - 2.4 mg/dL   NT-PRO BNP    Collection Time: 11/13/20  9:07 AM   Result Value Ref Range    NT pro- (H) <450 PG/ML   TYPE & SCREEN    Collection Time: 11/13/20  9:07 AM   Result Value Ref Range    Crossmatch Expiration 11/16/2020,2359     ABO/Rh(D) A POSITIVE     Antibody screen NEG    EKG, 12 LEAD, INITIAL    Collection Time: 11/13/20 10:09 AM   Result Value Ref Range    Ventricular Rate 69 BPM    Atrial Rate 69 BPM    P-R Interval 230 ms    QRS Duration 86 ms    Q-T Interval 362 ms    QTC Calculation (Bezet) 387 ms    Calculated P Axis 32 degrees    Calculated T Axis -131 degrees    Diagnosis       Sinus rhythm with 1st degree A-V block  Left ventricular hypertrophy with repolarization abnormality  Abnormal ECG  When compared with ECG of 14-OCT-2020 08:46,  MN interval has increased  Criteria for Septal infarct are no longer Present  T wave inversion now evident in Inferior leads  Confirmed by HIREN MCRAE (), ILEANA HOLT ([id number]) on 11/13/2020 4:39:59 PM     URINALYSIS W/ RFLX MICROSCOPIC    Collection Time: 11/13/20 10:38 PM   Result Value Ref Range    Color YELLOW      Appearance CLEAR      Specific gravity 1.013 1.001 - 1.023      pH (UA) 5.5 5.0 - 9.0      Protein Negative NEG mg/dL    Glucose Negative mg/dL    Ketone Negative NEG mg/dL    Bilirubin Negative NEG      Blood Negative NEG      Urobilinogen 0.2 0.2 - 1.0 EU/dL    Nitrites Negative NEG      Leukocyte Esterase TRACE (A) NEG      WBC 5-10 0 /hpf    RBC 3-5 0 /hpf    Epithelial cells 0-3 0 /hpf    Bacteria 0 0 /hpf    Casts 0-3 0 /lpf   METABOLIC PANEL, BASIC    Collection Time: 11/13/20 11:47 PM   Result Value Ref Range    Sodium 140 136 - 145 mmol/L    Potassium 4.0 3.5 - 5.1 mmol/L    Chloride 108 (H) 98 - 107 mmol/L    CO2 29 21 - 32 mmol/L    Anion gap 3 (L) 7 - 16 mmol/L    Glucose 94 65 - 100 mg/dL    BUN 14 8 - 23 MG/DL    Creatinine 0.88 0.8 - 1.5 MG/DL    GFR est AA >60 >60 ml/min/1.73m2    GFR est non-AA >60 >60 ml/min/1.73m2    Calcium 8.6 8.3 - 10.4 MG/DL   CBC W/O DIFF    Collection Time: 11/13/20 11:47 PM   Result Value Ref Range    WBC 6.5 4.3 - 11.1 K/uL    RBC 3.92 (L) 4.23 - 5.6 M/uL    HGB 11.7 (L) 13.6 - 17.2 g/dL    HCT 36.2 (L) 41.1 - 50.3 %    MCV 92.3 79.6 - 97.8 FL    MCH 29.8 26.1 - 32.9 PG    MCHC 32.3 31.4 - 35.0 g/dL    RDW 14.6 11.9 - 14.6 %    PLATELET 975 190 - 010 K/uL    MPV 9.2 (L) 9.4 - 12.3 FL    ABSOLUTE NRBC 0.00 0.0 - 0.2 K/uL         Patient Instructions:       Current Discharge Medication List      CONTINUE these medications which have NOT CHANGED    Details   clopidogreL (Plavix) 75 mg tab Take 75 mg by mouth daily. Indications: continued after cath per Dr. Melvern Skiff      tamsulosin [facility]) 0.4 mg capsule Take 0.4 mg by mouth daily. DIGEST PROBIOTIC, S.BOULARDII, 250 mg capsule TAKE 1 CAPSULE BY MOUTH TWICE A DAY      ergocalciferol (ERGOCALCIFEROL) 50,000 unit capsule TAKE 1 CAPSULE BY MOUTH ONCE EVERY SEVEN DAYS  Qty: 12 Cap, Refills: 11    Comments: Please consider 90 day supplies to promote better adherence      nitroglycerin (NITROSTAT) 0.4 mg SL tablet 1 Tab by SubLINGual route every five (5) minutes as needed for Chest Pain.   Qty: 1 Bottle, Refills: 11      aspirin delayed-release 81 mg tablet Take 81 mg by mouth daily. carvediloL (COREG) 6.25 mg tablet Take  by mouth two (2) times daily (with meals). acetaminophen (TYLENOL) 500 mg tablet Take 1,000 mg by mouth. nystatin (MYCOSTATIN) topical cream Apply  to affected area two (2) times a day. Qty: 45 g, Refills: 3           Sin Schaeffer NP  11/14/20  7:14 AM    ATTENDING ADDENDUM:    Patient seen and examined by me. Agree with above note by physician extender. Key findings are:  No CP or ROME, no arrhythmias or presyncope overnight, ready to go home prior to TAVR in 3 days. CV- RRR with severe AS murmur, no S3, no JVD at 60 deg  Lungs- Clear bilaterally  Abd- soft, nontender, nondistended  Ext- no edema    Plan: As above. Rest over weekend, TAVR Tuesday.      Mya Vo MD  [address] Cardiology  Pager [phone]

## 2020-11-14 NOTE — PROGRESS NOTES
Discharge instructions reviewed with Pt.s daughter and Pt. Prescriptions given for Coreg and med info sheets provided for all new medications. Opportunity for questions provided. Pt.s daughter and Pt. voiced understanding of all discharge instructions. IV's removed. Unable to obtain discharge signature due to computers not working.

## 2020-11-14 NOTE — ROUTINE PROCESS
Bedside and Verbal report given to [address] RN by self. Report included SBAR, Kardex, ED summary, procedure summary, recent results and cardiac rhythm NSR.

## 2020-11-14 NOTE — DISCHARGE INSTRUCTIONS
Patient Education     DISCHARGE SUMMARY from Nurse    PATIENT INSTRUCTIONS:    After general anesthesia or intravenous sedation, for 24 hours or while taking prescription Narcotics:  · Limit your activities  · Do not drive and operate hazardous machinery  · Do not make important personal or business decisions  · Do  not drink alcoholic beverages  · If you have not urinated within 8 hours after discharge, please contact your surgeon on call. Report the following to your surgeon:  · Excessive pain, swelling, redness or odor of or around the surgical area  · Temperature over 100.5  · Nausea and vomiting lasting longer than 4 hours or if unable to take medications  · Any signs of decreased circulation or nerve impairment to extremity: change in color, persistent  numbness, tingling, coldness or increase pain  · Any questions    What to do at Home:  Recommended activity: Activity as tolerated,     *  Please give a list of your current medications to your Primary Care Provider. *  Please update this list whenever your medications are discontinued, doses are      changed, or new medications (including over-the-counter products) are added. *  Please carry medication information at all times in case of emergency situations. These are general instructions for a healthy lifestyle:    No smoking/ No tobacco products/ Avoid exposure to second hand smoke  Surgeon General's Warning:  Quitting smoking now greatly reduces serious risk to your health. Obesity, smoking, and sedentary lifestyle greatly increases your risk for illness    A healthy diet, regular physical exercise & weight monitoring are important for maintaining a healthy lifestyle    You may be retaining fluid if you have a history of heart failure or if you experience any of the following symptoms:  Weight gain of 3 pounds or more overnight or 5 pounds in a week, increased swelling in our hands or feet or shortness of breath while lying flat in bed. Please call your doctor as soon as you notice any of these symptoms; do not wait until your next office visit. The discharge information has been reviewed with the patient. The patient verbalized understanding. Discharge medications reviewed with the patient and appropriate educational materials and side effects teaching were provided. ___________________________________________________________________________________________________________________________________     Learning About Transcatheter Aortic Valve Replacement (TAVR)  What is TAVR? Transcatheter aortic valve replacement (TAVR) is a procedure that replaces the aortic heart valve. It is done to treat aortic valve stenosis. In aortic valve stenosis, the valve between your heart and the large blood vessel that carries blood to the body (aorta) has narrowed. That forces the heart to pump harder to get enough blood through the valve. TAVR can help some people feel better and live longer. In TAVR, the doctor uses a catheter to put in the new heart valve. Open-heart surgery is not done. How is TAVR done? TAVR is often done through an incision (cut) in the groin. But sometimes a small cut is made in the chest. The doctor uses a tube called a catheter and special tools that fit inside the catheter. The doctor puts the catheter into a blood vessel and moves it through the blood vessel and into the heart. A specially designed artificial valve fits inside the catheter. The doctor then moves the new valve into the damaged aortic valve. The artificial valve expands and takes the place of the damaged aortic valve. You may be asleep for the procedure, or you may get a sedative that will help you relax. You may stay in the hospital for up to several days after the procedure. What can you expect after TAVR? · While you are in the hospital, your doctors and nurses will monitor you to check how the new valve is working.   · You will receive information from the hospital about diet, activities, and medicine. · You will need to have regular checkups with your doctor. · Your doctor may suggest that you attend a cardiac rehab program. In cardiac rehab, a team of health professionals provides education and support to help you recover and prevent problems with your heart. Ask your doctor if rehab is right for you. · When you leave the hospital, your doctor may give you a blood thinner for a few months to prevent blood clots. If you get a blood thinner, be sure you get instructions about how to take your medicine safely. Blood thinners can cause serious bleeding problems. Follow-up care is a key part of your treatment and safety. Be sure to make and go to all appointments, and call your doctor if you are having problems. It's also a good idea to know your test results and keep a list of the medicines you take. Where can you learn more? Go to http://www.gray.com/  Enter K886 in the search box to learn more about \"Learning About Transcatheter Aortic Valve Replacement (TAVR). \"  Current as of: December 16, 2019               Content Version: 12.6  © 9989-4470 [employer]. Care instructions adapted under license by [employer] (which disclaims liability or warranty for this information). If you have questions about a medical condition or this instruction, always ask your healthcare professional. [address] any warranty or liability for your use of this information.

## 2020-11-14 NOTE — ROUTINE PROCESS
Bedside and Verbal shift change report given to Rosa Isela Simpson RN (oncoming nurse) by self Manasa Patch nurse).  Report included the following information SBAR, Kardex, Intake/Output, MAR, Recent Results

## 2020-11-15 LAB
ATRIAL RATE: 72 BPM
CALCULATED P AXIS, ECG09: 50 DEGREES
CALCULATED R AXIS, ECG10: 18 DEGREES
CALCULATED T AXIS, ECG11: 163 DEGREES
DIAGNOSIS, 93000: NORMAL
P-R INTERVAL, ECG05: 226 MS
Q-T INTERVAL, ECG07: 402 MS
QRS DURATION, ECG06: 78 MS
QTC CALCULATION (BEZET), ECG08: 440 MS
VENTRICULAR RATE, ECG03: 72 BPM

## 2020-11-16 ENCOUNTER — PATIENT OUTREACH (OUTPATIENT)
Dept: CASE MANAGEMENT | Age: 80
End: 2020-11-16

## 2020-11-16 ENCOUNTER — HOSPITAL ENCOUNTER (OUTPATIENT)
Dept: LAB | Age: 80
Discharge: HOME | DRG: 267 | End: 2020-11-16
Payer: MEDICARE

## 2020-11-16 LAB
BACTERIA SPEC CULT: NORMAL
HISTORY CHECKED?,CKHIST: NORMAL
SERVICE CMNT-IMP: NORMAL

## 2020-11-16 PROCEDURE — 86923 COMPATIBILITY TEST ELECTRIC: CPT

## 2020-11-16 PROCEDURE — 36415 COLL VENOUS BLD VENIPUNCTURE: CPT

## 2020-11-16 PROCEDURE — 86900 BLOOD TYPING SEROLOGIC ABO: CPT

## 2020-11-16 NOTE — PROGRESS NOTES
CTN attempted to outreach to patient per hospital discharge 11/13/2020 for JAD [facility]IONG call. Unable to reach patient. Message left with contact information requesting a return call. Will continue to outreach per protocol.

## 2020-11-17 ENCOUNTER — PATIENT OUTREACH (OUTPATIENT)
Dept: CASE MANAGEMENT | Age: 80
End: 2020-11-17

## 2020-11-17 ENCOUNTER — HOSPITAL ENCOUNTER (INPATIENT)
Age: 80
LOS: 7 days | Discharge: HOME HEALTH | DRG: 267 | End: 2020-11-24
Attending: INTERNAL MEDICINE | Admitting: INTERNAL MEDICINE
Payer: MEDICARE

## 2020-11-17 ENCOUNTER — ANESTHESIA (OUTPATIENT)
Dept: SURGERY | Age: 80
DRG: 267 | End: 2020-11-17
Payer: MEDICARE

## 2020-11-17 DIAGNOSIS — I95.1 ORTHOSTATIC HYPOTENSION: ICD-10-CM

## 2020-11-17 DIAGNOSIS — I35.0 NONRHEUMATIC AORTIC VALVE STENOSIS: Chronic | ICD-10-CM

## 2020-11-17 DIAGNOSIS — Z95.810 BIVENTRICULAR ICD (IMPLANTABLE CARDIOVERTER-DEFIBRILLATOR) IN PLACE: Primary | ICD-10-CM

## 2020-11-17 DIAGNOSIS — R55 SYNCOPE AND COLLAPSE: ICD-10-CM

## 2020-11-17 DIAGNOSIS — E78.00 PURE HYPERCHOLESTEROLEMIA: ICD-10-CM

## 2020-11-17 DIAGNOSIS — I50.42 CHRONIC COMBINED SYSTOLIC AND DIASTOLIC CONGESTIVE HEART FAILURE (HCC): Chronic | ICD-10-CM

## 2020-11-17 DIAGNOSIS — I44.7 LBBB (LEFT BUNDLE BRANCH BLOCK): ICD-10-CM

## 2020-11-17 DIAGNOSIS — I45.9 HEART BLOCK: ICD-10-CM

## 2020-11-17 DIAGNOSIS — I25.10 CORONARY ARTERY DISEASE INVOLVING NATIVE CORONARY ARTERY OF NATIVE HEART WITHOUT ANGINA PECTORIS: ICD-10-CM

## 2020-11-17 DIAGNOSIS — Z95.2 S/P TAVR (TRANSCATHETER AORTIC VALVE REPLACEMENT): ICD-10-CM

## 2020-11-17 LAB
ATRIAL RATE: 58 BPM
ATRIAL RATE: 71 BPM
CALCULATED P AXIS, ECG09: 56 DEGREES
CALCULATED P AXIS, ECG09: 67 DEGREES
CALCULATED R AXIS, ECG10: 22 DEGREES
CALCULATED R AXIS, ECG10: 36 DEGREES
CALCULATED T AXIS, ECG11: -170 DEGREES
CALCULATED T AXIS, ECG11: 145 DEGREES
DIAGNOSIS, 93000: NORMAL
DIAGNOSIS, 93000: NORMAL
GLUCOSE BLD STRIP.AUTO-MCNC: 101 MG/DL (ref 65–100)
P-R INTERVAL, ECG05: 250 MS
P-R INTERVAL, ECG05: 270 MS
Q-T INTERVAL, ECG07: 478 MS
Q-T INTERVAL, ECG07: 522 MS
QRS DURATION, ECG06: 132 MS
QRS DURATION, ECG06: 136 MS
QTC CALCULATION (BEZET), ECG08: 512 MS
QTC CALCULATION (BEZET), ECG08: 519 MS
VENTRICULAR RATE, ECG03: 58 BPM
VENTRICULAR RATE, ECG03: 71 BPM

## 2020-11-17 PROCEDURE — 82947 ASSAY GLUCOSE BLOOD QUANT: CPT

## 2020-11-17 PROCEDURE — 93005 ELECTROCARDIOGRAM TRACING: CPT | Performed by: INTERNAL MEDICINE

## 2020-11-17 PROCEDURE — 77030040922 HC BLNKT HYPOTHRM STRY -A: Performed by: NURSE ANESTHETIST, CERTIFIED REGISTERED

## 2020-11-17 PROCEDURE — C1894 INTRO/SHEATH, NON-LASER: HCPCS

## 2020-11-17 PROCEDURE — 2709999900 HC NON-CHARGEABLE SUPPLY

## 2020-11-17 PROCEDURE — C1760 CLOSURE DEV, VASC: HCPCS

## 2020-11-17 PROCEDURE — 77030005401 HC CATH RAD ARRO -A: Performed by: NURSE ANESTHETIST, CERTIFIED REGISTERED

## 2020-11-17 PROCEDURE — 74011250637 HC RX REV CODE- 250/637: Performed by: INTERNAL MEDICINE

## 2020-11-17 PROCEDURE — 77030004558 HC CATH ANGI DX SUPR TORQ CARD -A

## 2020-11-17 PROCEDURE — 77030013794 HC KT TRNSDUC BLD EDWD -B: Performed by: NURSE ANESTHETIST, CERTIFIED REGISTERED

## 2020-11-17 PROCEDURE — 74011250636 HC RX REV CODE- 250/636: Performed by: INTERNAL MEDICINE

## 2020-11-17 PROCEDURE — 76210000000 HC OR PH I REC 2 TO 2.5 HR: Performed by: INTERNAL MEDICINE

## 2020-11-17 PROCEDURE — C1769 GUIDE WIRE: HCPCS | Performed by: INTERNAL MEDICINE

## 2020-11-17 PROCEDURE — C1769 GUIDE WIRE: HCPCS

## 2020-11-17 PROCEDURE — 82962 GLUCOSE BLOOD TEST: CPT

## 2020-11-17 PROCEDURE — 74011250636 HC RX REV CODE- 250/636: Performed by: PHYSICIAN ASSISTANT

## 2020-11-17 PROCEDURE — 76060000034 HC ANESTHESIA 1.5 TO 2 HR: Performed by: INTERNAL MEDICINE

## 2020-11-17 PROCEDURE — 74011250636 HC RX REV CODE- 250/636: Performed by: ANESTHESIOLOGY

## 2020-11-17 PROCEDURE — 76010000197 HC CV SURG 1.5 TO 2 HR INTENSV-TIER 1: Performed by: INTERNAL MEDICINE

## 2020-11-17 PROCEDURE — 77030013292 HC BOWL MX PRSM J&J -A: Performed by: NURSE ANESTHETIST, CERTIFIED REGISTERED

## 2020-11-17 PROCEDURE — 74011250636 HC RX REV CODE- 250/636: Performed by: NURSE ANESTHETIST, CERTIFIED REGISTERED

## 2020-11-17 PROCEDURE — 74011000250 HC RX REV CODE- 250: Performed by: NURSE ANESTHETIST, CERTIFIED REGISTERED

## 2020-11-17 PROCEDURE — 82803 BLOOD GASES ANY COMBINATION: CPT

## 2020-11-17 PROCEDURE — 02RF38Z REPLACEMENT OF AORTIC VALVE WITH ZOOPLASTIC TISSUE, PERCUTANEOUS APPROACH: ICD-10-PCS | Performed by: INTERNAL MEDICINE

## 2020-11-17 PROCEDURE — 77030020407 HC IV BLD WRMR ST 3M -A: Performed by: NURSE ANESTHETIST, CERTIFIED REGISTERED

## 2020-11-17 PROCEDURE — 65660000004 HC RM CVT STEPDOWN

## 2020-11-17 PROCEDURE — 77030013687 HC GD NDL BARD -B

## 2020-11-17 PROCEDURE — 77030037496 HC VLV AORT TRNSCTH -L: Performed by: INTERNAL MEDICINE

## 2020-11-17 PROCEDURE — 74011000250 HC RX REV CODE- 250: Performed by: INTERNAL MEDICINE

## 2020-11-17 PROCEDURE — 77030002996 HC SUT SLK J&J -A: Performed by: INTERNAL MEDICINE

## 2020-11-17 PROCEDURE — 33361 REPLACE AORTIC VALVE PERQ: CPT | Performed by: INTERNAL MEDICINE

## 2020-11-17 PROCEDURE — 77030016699 HC CATH ANGI DX INFN1 CARD -A

## 2020-11-17 PROCEDURE — 74011000258 HC RX REV CODE- 258: Performed by: NURSE ANESTHETIST, CERTIFIED REGISTERED

## 2020-11-17 PROCEDURE — 77030005318 HC CATH ELECTRD PACE TMP BARD -C

## 2020-11-17 PROCEDURE — 93308 TTE F-UP OR LMTD: CPT

## 2020-11-17 PROCEDURE — 2709999900 HC NON-CHARGEABLE SUPPLY: Performed by: INTERNAL MEDICINE

## 2020-11-17 PROCEDURE — 77030004559 HC CATH ANGI DX SUPT CARD -B

## 2020-11-17 PROCEDURE — 77030013120 HC ELECTRD PD DEFIB ZOLL -F: Performed by: INTERNAL MEDICINE

## 2020-11-17 PROCEDURE — 74011250637 HC RX REV CODE- 250/637: Performed by: PHYSICIAN ASSISTANT

## 2020-11-17 DEVICE — VALVE AORT 26 MM TRANSCATHETER HRT VLV SYS SAPIEN 3 ULTRA: Type: IMPLANTABLE DEVICE | Site: AORTIC VALVE | Status: FUNCTIONAL

## 2020-11-17 RX ORDER — MORPHINE SULFATE 2 MG/ML
2 INJECTION, SOLUTION INTRAMUSCULAR; INTRAVENOUS
Status: DISCONTINUED | OUTPATIENT
Start: 2020-11-17 | End: 2020-11-24 | Stop reason: HOSPADM

## 2020-11-17 RX ORDER — LISINOPRIL 5 MG/1
5 TABLET ORAL DAILY
Status: DISCONTINUED | OUTPATIENT
Start: 2020-11-18 | End: 2020-11-24 | Stop reason: HOSPADM

## 2020-11-17 RX ORDER — NALOXONE HYDROCHLORIDE 0.4 MG/ML
0.2 INJECTION, SOLUTION INTRAMUSCULAR; INTRAVENOUS; SUBCUTANEOUS AS NEEDED
Status: DISCONTINUED | OUTPATIENT
Start: 2020-11-17 | End: 2020-11-17 | Stop reason: HOSPADM

## 2020-11-17 RX ORDER — PROTAMINE SULFATE 10 MG/ML
INJECTION, SOLUTION INTRAVENOUS AS NEEDED
Status: DISCONTINUED | OUTPATIENT
Start: 2020-11-17 | End: 2020-11-17 | Stop reason: HOSPADM

## 2020-11-17 RX ORDER — SODIUM CHLORIDE, SODIUM LACTATE, POTASSIUM CHLORIDE, CALCIUM CHLORIDE 600; 310; 30; 20 MG/100ML; MG/100ML; MG/100ML; MG/100ML
INJECTION, SOLUTION INTRAVENOUS
Status: DISCONTINUED | OUTPATIENT
Start: 2020-11-17 | End: 2020-11-17 | Stop reason: HOSPADM

## 2020-11-17 RX ORDER — CLOPIDOGREL BISULFATE 75 MG/1
75 TABLET ORAL DAILY
Status: DISCONTINUED | OUTPATIENT
Start: 2020-11-18 | End: 2020-11-24 | Stop reason: HOSPADM

## 2020-11-17 RX ORDER — ACETAMINOPHEN 325 MG/1
650 TABLET ORAL
Status: DISCONTINUED | OUTPATIENT
Start: 2020-11-17 | End: 2020-11-23 | Stop reason: SDUPTHER

## 2020-11-17 RX ORDER — CEFAZOLIN SODIUM/WATER 2 G/20 ML
2 SYRINGE (ML) INTRAVENOUS ONCE
Status: COMPLETED | OUTPATIENT
Start: 2020-11-17 | End: 2020-11-17

## 2020-11-17 RX ORDER — SODIUM CHLORIDE, SODIUM LACTATE, POTASSIUM CHLORIDE, CALCIUM CHLORIDE 600; 310; 30; 20 MG/100ML; MG/100ML; MG/100ML; MG/100ML
75 INJECTION, SOLUTION INTRAVENOUS CONTINUOUS
Status: DISCONTINUED | OUTPATIENT
Start: 2020-11-17 | End: 2020-11-17 | Stop reason: HOSPADM

## 2020-11-17 RX ORDER — SODIUM CHLORIDE 0.9 % (FLUSH) 0.9 %
5-40 SYRINGE (ML) INJECTION AS NEEDED
Status: DISCONTINUED | OUTPATIENT
Start: 2020-11-17 | End: 2020-11-24 | Stop reason: HOSPADM

## 2020-11-17 RX ORDER — NITROGLYCERIN 0.4 MG/1
0.4 TABLET SUBLINGUAL
Status: DISCONTINUED | OUTPATIENT
Start: 2020-11-17 | End: 2020-11-24 | Stop reason: HOSPADM

## 2020-11-17 RX ORDER — OXYCODONE AND ACETAMINOPHEN 5; 325 MG/1; MG/1
1 TABLET ORAL AS NEEDED
Status: DISCONTINUED | OUTPATIENT
Start: 2020-11-17 | End: 2020-11-17 | Stop reason: HOSPADM

## 2020-11-17 RX ORDER — ASPIRIN 81 MG/1
81 TABLET ORAL DAILY
Status: DISCONTINUED | OUTPATIENT
Start: 2020-11-17 | End: 2020-11-17

## 2020-11-17 RX ORDER — HYDROMORPHONE HYDROCHLORIDE 2 MG/ML
0.5 INJECTION, SOLUTION INTRAMUSCULAR; INTRAVENOUS; SUBCUTANEOUS
Status: DISCONTINUED | OUTPATIENT
Start: 2020-11-17 | End: 2020-11-17 | Stop reason: HOSPADM

## 2020-11-17 RX ORDER — ONDANSETRON 2 MG/ML
4 INJECTION INTRAMUSCULAR; INTRAVENOUS
Status: DISCONTINUED | OUTPATIENT
Start: 2020-11-17 | End: 2020-11-23 | Stop reason: SDUPTHER

## 2020-11-17 RX ORDER — PROPOFOL 10 MG/ML
INJECTION, EMULSION INTRAVENOUS AS NEEDED
Status: DISCONTINUED | OUTPATIENT
Start: 2020-11-17 | End: 2020-11-17 | Stop reason: HOSPADM

## 2020-11-17 RX ORDER — SODIUM CHLORIDE 9 MG/ML
75 INJECTION, SOLUTION INTRAVENOUS CONTINUOUS
Status: DISCONTINUED | OUTPATIENT
Start: 2020-11-17 | End: 2020-11-18

## 2020-11-17 RX ORDER — LORAZEPAM 1 MG/1
1 TABLET ORAL
Status: DISCONTINUED | OUTPATIENT
Start: 2020-11-17 | End: 2020-11-24 | Stop reason: HOSPADM

## 2020-11-17 RX ORDER — SODIUM CHLORIDE 0.9 % (FLUSH) 0.9 %
5-40 SYRINGE (ML) INJECTION EVERY 8 HOURS
Status: DISCONTINUED | OUTPATIENT
Start: 2020-11-17 | End: 2020-11-17 | Stop reason: HOSPADM

## 2020-11-17 RX ORDER — SODIUM CHLORIDE 0.9 % (FLUSH) 0.9 %
5-40 SYRINGE (ML) INJECTION EVERY 8 HOURS
Status: DISCONTINUED | OUTPATIENT
Start: 2020-11-17 | End: 2020-11-24 | Stop reason: HOSPADM

## 2020-11-17 RX ORDER — LIDOCAINE HYDROCHLORIDE 10 MG/ML
0.1 INJECTION INFILTRATION; PERINEURAL AS NEEDED
Status: DISCONTINUED | OUTPATIENT
Start: 2020-11-17 | End: 2020-11-17 | Stop reason: HOSPADM

## 2020-11-17 RX ORDER — SODIUM CHLORIDE 0.9 % (FLUSH) 0.9 %
5-40 SYRINGE (ML) INJECTION AS NEEDED
Status: DISCONTINUED | OUTPATIENT
Start: 2020-11-17 | End: 2020-11-17 | Stop reason: HOSPADM

## 2020-11-17 RX ORDER — CLOPIDOGREL BISULFATE 75 MG/1
300 TABLET ORAL ONCE
Status: ACTIVE | OUTPATIENT
Start: 2020-11-17 | End: 2020-11-17

## 2020-11-17 RX ORDER — GUAIFENESIN 100 MG/5ML
81 LIQUID (ML) ORAL DAILY
Status: DISCONTINUED | OUTPATIENT
Start: 2020-11-18 | End: 2020-11-24 | Stop reason: HOSPADM

## 2020-11-17 RX ORDER — MIDAZOLAM HYDROCHLORIDE 1 MG/ML
2 INJECTION, SOLUTION INTRAMUSCULAR; INTRAVENOUS
Status: DISCONTINUED | OUTPATIENT
Start: 2020-11-17 | End: 2020-11-17 | Stop reason: HOSPADM

## 2020-11-17 RX ORDER — HYDROCODONE BITARTRATE AND ACETAMINOPHEN 5; 325 MG/1; MG/1
1 TABLET ORAL
Status: DISCONTINUED | OUTPATIENT
Start: 2020-11-17 | End: 2020-11-23 | Stop reason: SDUPTHER

## 2020-11-17 RX ORDER — TAMSULOSIN HYDROCHLORIDE 0.4 MG/1
0.4 CAPSULE ORAL DAILY
Status: DISCONTINUED | OUTPATIENT
Start: 2020-11-18 | End: 2020-11-24 | Stop reason: HOSPADM

## 2020-11-17 RX ORDER — PROPOFOL 10 MG/ML
INJECTION, EMULSION INTRAVENOUS
Status: DISCONTINUED | OUTPATIENT
Start: 2020-11-17 | End: 2020-11-17 | Stop reason: HOSPADM

## 2020-11-17 RX ORDER — CARVEDILOL 3.12 MG/1
6.25 TABLET ORAL 2 TIMES DAILY WITH MEALS
Status: DISCONTINUED | OUTPATIENT
Start: 2020-11-17 | End: 2020-11-24 | Stop reason: HOSPADM

## 2020-11-17 RX ORDER — MIDAZOLAM HYDROCHLORIDE 1 MG/ML
INJECTION, SOLUTION INTRAMUSCULAR; INTRAVENOUS AS NEEDED
Status: DISCONTINUED | OUTPATIENT
Start: 2020-11-17 | End: 2020-11-17 | Stop reason: HOSPADM

## 2020-11-17 RX ORDER — HYDROCODONE BITARTRATE AND ACETAMINOPHEN 5; 325 MG/1; MG/1
1 TABLET ORAL
Status: DISCONTINUED | OUTPATIENT
Start: 2020-11-17 | End: 2020-11-17

## 2020-11-17 RX ORDER — HEPARIN SODIUM 1000 [USP'U]/ML
INJECTION, SOLUTION INTRAVENOUS; SUBCUTANEOUS AS NEEDED
Status: DISCONTINUED | OUTPATIENT
Start: 2020-11-17 | End: 2020-11-17 | Stop reason: HOSPADM

## 2020-11-17 RX ORDER — CEFAZOLIN SODIUM/WATER 2 G/20 ML
2 SYRINGE (ML) INTRAVENOUS EVERY 8 HOURS
Status: DISCONTINUED | OUTPATIENT
Start: 2020-11-17 | End: 2020-11-18

## 2020-11-17 RX ORDER — HYDROCORTISONE SODIUM SUCCINATE 100 MG/2ML
INJECTION, POWDER, FOR SOLUTION INTRAMUSCULAR; INTRAVENOUS AS NEEDED
Status: DISCONTINUED | OUTPATIENT
Start: 2020-11-17 | End: 2020-11-17 | Stop reason: HOSPADM

## 2020-11-17 RX ORDER — ACETAMINOPHEN 325 MG/1
650 TABLET ORAL
Status: DISCONTINUED | OUTPATIENT
Start: 2020-11-17 | End: 2020-11-17 | Stop reason: SDUPTHER

## 2020-11-17 RX ADMIN — Medication 10 ML: at 23:19

## 2020-11-17 RX ADMIN — SODIUM CHLORIDE, SODIUM LACTATE, POTASSIUM CHLORIDE, AND CALCIUM CHLORIDE 75 ML/HR: 600; 310; 30; 20 INJECTION, SOLUTION INTRAVENOUS at 07:19

## 2020-11-17 RX ADMIN — PHENYLEPHRINE HYDROCHLORIDE 60 MCG: 10 INJECTION INTRAVENOUS at 10:13

## 2020-11-17 RX ADMIN — HYDROMORPHONE HYDROCHLORIDE 0.5 MG: 2 INJECTION INTRAMUSCULAR; INTRAVENOUS; SUBCUTANEOUS at 16:33

## 2020-11-17 RX ADMIN — PROTAMINE SULFATE 10 MG: 10 INJECTION, SOLUTION INTRAVENOUS at 10:46

## 2020-11-17 RX ADMIN — SODIUM CHLORIDE, SODIUM LACTATE, POTASSIUM CHLORIDE, AND CALCIUM CHLORIDE: 600; 310; 30; 20 INJECTION, SOLUTION INTRAVENOUS at 09:19

## 2020-11-17 RX ADMIN — PROTAMINE SULFATE 10 MG: 10 INJECTION, SOLUTION INTRAVENOUS at 10:47

## 2020-11-17 RX ADMIN — PROTAMINE SULFATE 10 MG: 10 INJECTION, SOLUTION INTRAVENOUS at 10:44

## 2020-11-17 RX ADMIN — PROPOFOL 30 MG: 10 INJECTION, EMULSION INTRAVENOUS at 10:25

## 2020-11-17 RX ADMIN — Medication 3 AMPULE: at 07:29

## 2020-11-17 RX ADMIN — PROPOFOL 25 MCG/KG/MIN: 10 INJECTION, EMULSION INTRAVENOUS at 09:27

## 2020-11-17 RX ADMIN — HYDROCORTISONE SODIUM SUCCINATE 100 MG: 100 INJECTION, POWDER, FOR SOLUTION INTRAMUSCULAR; INTRAVENOUS at 10:44

## 2020-11-17 RX ADMIN — PROTAMINE SULFATE 10 MG: 10 INJECTION, SOLUTION INTRAVENOUS at 10:43

## 2020-11-17 RX ADMIN — HYDROCODONE BITARTRATE AND ACETAMINOPHEN 1 TABLET: 5; 325 TABLET ORAL at 21:01

## 2020-11-17 RX ADMIN — SODIUM CHLORIDE 80 MCG: 900 INJECTION, SOLUTION INTRAVENOUS at 09:20

## 2020-11-17 RX ADMIN — Medication 1 AMPULE: at 23:18

## 2020-11-17 RX ADMIN — HEPARIN SODIUM 17000 UNITS: 1000 INJECTION, SOLUTION INTRAVENOUS; SUBCUTANEOUS at 10:07

## 2020-11-17 RX ADMIN — SODIUM CHLORIDE 75 ML/HR: 900 INJECTION, SOLUTION INTRAVENOUS at 19:04

## 2020-11-17 RX ADMIN — MIDAZOLAM 1 MG: 1 INJECTION INTRAMUSCULAR; INTRAVENOUS at 09:24

## 2020-11-17 RX ADMIN — CEFAZOLIN SODIUM 2 G: 100 INJECTION, POWDER, LYOPHILIZED, FOR SOLUTION INTRAVENOUS at 19:04

## 2020-11-17 RX ADMIN — Medication 2 G: at 09:40

## 2020-11-17 NOTE — OP NOTES
PROCEDURE/OPERATIVE REPORT    Patient: Jeronimo Box MRN: [id number]  SSN: [id number]    YOB: 1940  Age: 79 y.o. Sex: male      DATE OF PROCEDURE: 11/17/2020     PROCEDURE: TRANSCATHETER AORTIC VALVE REPLACEMENT  VIA THE RIGHT FEMORAL ARTERY    INDICATION:   The patient is a 79 y.o. y.o with severe symptomatic aortic stenosis. After a thorough preoperative evaluation by multidisciplinary valve board, the patient was felt to be elevated risk for surgical AVR. Based on this elevated risk, it was recommended patient proceed with transcatheter aortic valve replacement. This was discussed with patient and after a thorough discussion of all the risks and proposed benefits, the patient agreed to proceed. PRIMARY INTERVENTIONAL CARDIOLOGIST: Leelee Jama MD     PRIMARY CARDIOTHORACIC SURGEON:  Gustavo Rivero. Jamarcus Oates MD      ASSISTING INTERVENTIONAL CARDIOLOGIST: Grey Jackson MD    TYPE OF ANESTHESIA:  MODERATE SEDATION    PROCEDURAL DETAILS: Patient was brought to the hybrid operating suite. Time-out performed. Standard monitoring lines placed. Patient underwent moderate sedation by Anesthesiology with continuous monitoring of hemodynamics and oxygenation. Intravenous antibiotics were administered. Patient was prepped and draped in standard, meticulous surgical fashion. Utilizing a Site-rite Ultrasound, the left common femoral artery and vein were identified. A static image was placed on the chart. Under direct ultrasound guidance, a 6-Slovak sheath was placed in the left common femoral artery and vein. At this point, a 6-Slovak LIMA catheter was advanced and utilized to selectively engage the ostium of the right common iliac artery. Angiography was performed in the AP projection with visualization of the common iliac, external iliac and femoral vessels. Anatomy was suitable for large bore arterial assess.   Under direct visualization, the right common femoral artery was entered with a micropuncture needle. This was then exchanged via a micropuncture catheter for a 6-Emirati sheath.     At this point, a transvenous balloon-tipped pacemaker was advanced via with left femoral venous sheath and placed in the RV apex under fluoroscopic guidance. Pacing was initiated and capture was ensured at 0.5 milliamps. Pacing was suspended and pacemaker was set for maximum output for the remainder of the procedure.      Next a Coplanar view was established. A pigtail catheter was advanced via the left femoral artery into the right coronary cusp. Angiography was performed, with establishment of a coplanar angle at 1 KIANNA  and 12 Caudal .     The 6-Emirati right common femoral sheath was then removed and the arteriotomy site was \"pre-closed\" utilizing 2 Perclose devices deployed in a 3 o'clock, 10 o'clock position. Intravenous heparin was administered and ACT was monitored with intermittent heparin administration to maintain a ACT greater than 250. After heparin was given, a 14 [place] Palacios sheath was successfully placed over a Lunderquist wire under continuous fluoroscopic guidance.      A 5 [place] Amplatz AL1 catheter was then advanced up into the aortic root via the  Palacios sheath, and a straight-tip Cordis wire was used to cross the stenotic aortic valve. The Amplatz catheter was placed in the left ventricle, and this was then exchanged for a Confida wire.     A 26 mm Palacios Sharita S3 valve was then prepped with 2 additional cc saline. This was advanced into the descending aorta. The deployment balloon was pulled back within the valve via standard fashion. The valve was then advanced around the aortic arch and across the native aortic valve under fluoroscopy guidance. The delivery sheath was pulled back in standard fashion. Angiography was performed and confirmed that the valve was in the appropriate position.  At this point, the patient was paced at 180 beats a minute, with appropriate hemodynamic collapse. The valve was deployed in standard fashion. Following valve deployment, a aortogram was performed showing patent coronary arteries and trivial aortic regurgitation. Transthoracic echocardiogram was performed that showed good position of the aortic valve prosthesis with  trivial paravalvular regurgitation. Based on the finding above, it was felt that the valve was deployed successfully.       At this point, the Palacios sheath was removed, and the Perclose sutures were deployed via standard fashion with good hemostasis. The patient was given IV Protamine and manual pressure was applied for 15 minutes.       The left common femoral artery was then imaged. The sheath was contained in the left common femoral artery. A Mynx vascular closure device was deployed with good hemostasis. The pacemaker was  left in place due to new LBBB.     CONCLUSION: Successful transcatheter aortic valve replacement with a 26 mm Palacios Sharita S3 valve via a right femoral aproach.      NOTE:  Yokasta Amaya MD was present and participated throughout the procedure.     Jerardo Goldman MD

## 2020-11-17 NOTE — H&P
[facility] CARDIOLOGY History &Physical                Primary Cardiologist: Kole Rick MD    Primary Care Physician:  Tasneem Verdugo MD    Subjective:     Patient is a 79 y.o. male presents with low flow/low gradient AS. EF 40-45%. He has diffuse pattern CAD and critical LCx disease. He was referred for CABG/AVR but due to severe debility he was determined he would be best severed with TAVR. CAD reviewed and felt ostial LCx disease was too high risk for PCI and he is being managed medically. He was admitted last week with syncope thought to be due to orthostasis and vagal spell. He has recovered and now here for TAVR. Kathleen Pazi Past Medical History:   Diagnosis Date    Aortic stenosis     Arthritis     generalized osteo.     CAD (coronary artery disease)     6/16/2016-had CP- stent X 1- no MI     Claustrophobia     GERD (gastroesophageal reflux disease)     probable symptoms daughter reports that lives with pt    Heart failure ([address]     Echo 08/18/20 EF 45-50%    Ill-defined condition     orthostatic hypotension    Sarcoidosis of lymph nodes     in remission    Seizures (HCC)     Dr Christine Callejas (cardio) said might have had TIA- questionable if he had a seizure    Shortness of breath     Syncope and collapse     Vertigo     occassional        Current Facility-Administered Medications:     aspirin delayed-release tablet 81 mg, 81 mg, Oral, DAILY, Samara Guzman PA    carvediloL (COREG) tablet 6.25 mg, 6.25 mg, Oral, BID WITH MEALS, [place], [place]    [START ON 11/18/2020] clopidogreL (PLAVIX) tablet 75 mg, 75 mg, Oral, DAILY, [place], [facility], [place]    [START ON 11/18/2020] lisinopriL (PRINIVIL, ZESTRIL) tablet 5 mg, 5 mg, Oral, DAILY, [place], [facility], [place]    [START ON 11/18/2020] tamsulosin (FLOMAX) capsule 0.4 mg, 0.4 mg, Oral, DAILY, [place], [facility], [place]    acetaminophen (TYLENOL) tablet 650 mg, 650 mg, Oral, Q4H PRN, [place], [place]    HYDROcodone-acetaminophen (NORCO) 5-325 mg per tablet 1 Tab, 1 Tab, Oral, Q6H PRN, Samara Guzman PA    ondansetron (ZOFRAN) injection 4 mg, 4 mg, IntraVENous, Q4H PRN, Maricruz Guzman PA    nitroglycerin (NITROSTAT) tablet 0.4 mg, 0.4 mg, SubLINGual, Q5MIN PRN, Maricruz Guzman PA    ceFAZolin (ANCEF) 2 g/20 mL in sterile water IV syringe, 2 g, IntraVENous, ONCE, Di Guzman [place], Stopped at 11/17/20 0720    lidocaine (XYLOCAINE) 10 mg/mL (1 %) injection 0.1 mL, 0.1 mL, SubCUTAneous, PRN, Angeli Marin MD    lactated Ringers infusion, 75 mL/hr, IntraVENous, CONTINUOUS, Angeli Marin MD, Last Rate: 75 mL/hr at 11/17/20 0719, 75 mL/hr at 11/17/20 0719    midazolam (VERSED) injection 2 mg, 2 mg, IntraVENous, ONCE PRN, Angeli Marin MD  Allergies   Allergen Reactions    Bactrim [Sulfamethoprim Ds] Other (comments)     Oliguria    Keppra [Levetiracetam] Other (comments)     Made legs weak    Levaquin [Levofloxacin] Diarrhea    Other Medication Other (comments)     \"Probanthene and Dartal\"? States stomach medication. Numbness and shakiness. \"Made him feel he was having a heart attack\"  \"Some medication he got in the \"     Social History     Tobacco Use    Smoking status: Former Smoker    Smokeless tobacco: Former User    Tobacco comment: quit over 20 years ago   Substance Use Topics    Alcohol use: No      Family History   Problem Relation Age of Onset    Heart Disease Mother 68        MI    Diabetes Mother         borderline     Hypertension Mother     Cancer Father     Stroke Brother     Coronary Artery Disease Other         Family History        Review of Systems    Review of Systems   Constitution: Positive for malaise/fatigue. Negative for chills and fever. Eyes: Negative for visual disturbance. Cardiovascular: Positive for dyspnea on exertion. Negative for chest pain, palpitations and syncope. Respiratory: Positive for shortness of breath. Negative for cough. Skin: Negative for color change and rash. Musculoskeletal: Positive for arthritis, back pain, joint pain and muscle weakness. Negative for muscle cramps. Gastrointestinal: Negative for abdominal pain, diarrhea and nausea. Genitourinary: Negative for dysuria. Neurological: Positive for dizziness and seizures. Negative for headaches. Psychiatric/Behavioral: Positive for memory loss. Negative for depression. Objective:       Visit Vitals  BP (!) 149/82 (BP 1 Location: Right arm, BP Patient Position: At rest)   Pulse 71   Temp 98.6 °F (37 °C)   Resp 18   Ht 6' (1.829 m)   Wt 204 lb 6.4 oz (92.7 kg)   SpO2 96%   BMI 27.72 kg/m²       No intake/output data recorded. No intake/output data recorded. Physical Exam   Constitutional: He is oriented to person, place, and time. He appears well-developed and well-nourished. HENT:   Head: Normocephalic and atraumatic. Mouth/Throat: Oropharynx is clear and moist.   Cardiovascular: Normal rate and regular rhythm. Murmur heard. Harsh midsystolic murmur is present at the upper right sternal border radiating to the neck. Pulmonary/Chest: Breath sounds normal. He has no wheezes. He has no rales. Abdominal: Soft. Bowel sounds are normal.   Musculoskeletal: Normal range of motion. Neurological: He is alert and oriented to person, place, and time. Skin: Skin is warm and dry. Psychiatric: He has a normal mood and affect.        ECG: normal sinus rhythm, 1st degree AV block     Data Review:     Recent Results (from the past 24 hour(s))   TYPE & SCREEN    Collection Time: 11/16/20 12:31 PM   Result Value Ref Range    Crossmatch Expiration 11/19/2020,2359     ABO/Rh(D) A POSITIVE     Antibody screen NEG     Unit number [id number]     Blood component type [facility]     Unit division 00     Status of unit ALLOCATED     Crossmatch result Compatible     Unit number [id number]     Blood component type [facility]     Unit division 00     Status of unit ALLOCATED Crossmatch result Compatible     Unit number [id number]     Blood component type RC LR     Unit division 00     Status of unit ALLOCATED     Crossmatch result Compatible     Unit number [id number]     Blood component type RC LR     Unit division 00     Status of unit ALLOCATED     Crossmatch result Compatible    RBC, ALLOCATE    Collection Time: 11/16/20  1:00 PM   Result Value Ref Range    HISTORY CHECKED? Historical check performed            Assessment/Plan:   Principal Problem: Aortic valve stenosis (11/17/2020) - Severe low flow/low gradient AS. EF 40-50%. He has significant debility and determined to be high risk for recovery post CABG. He has had recent syncope thought to be due to orthostasis and vagal episode in the face of severe AS. He is scheduled for TAVR today. He is educated about the risks for bleeding, vascular injury, CVA. MI, death and PM.  He is willing to proceed. Active Problems:    Coronary artery disease involving native coronary artery of native heart without angina pectoris (5/9/2016) - Complex ostial LCx disease and will be managed medically       Overview: 2016:  2.25x28 Xience dLAD - moderate mLAD disease treated medically      2020:  Severe ostial LCx and small D1 and dLAD disease managed medically      Orthostatic hypotension (6/13/2016) - Allow mild permissive hypertension       Pure hypercholesterolemia (6/27/2016)      Chronic combined systolic and diastolic congestive heart failure ([address] (9/11/2020) - Medical therapy appropriate.         Overview: 08/2020:  EF 40-50%      Syncope and collapse (11/13/2020) - As Edilson Hernandez MD

## 2020-11-17 NOTE — ANESTHESIA POSTPROCEDURE EVALUATION
Procedure(s):  TRANSCATHETER AORTIC VALVE REPLACEMENT.    total IV anesthesia    Anesthesia Post Evaluation      Multimodal analgesia: multimodal analgesia used between 6 hours prior to anesthesia start to PACU discharge  Patient location during evaluation: PACU  Patient participation: complete - patient participated  Level of consciousness: awake and alert  Pain management: adequate  Airway patency: patent  Anesthetic complications: no  Cardiovascular status: acceptable  Respiratory status: acceptable  Hydration status: acceptable  Post anesthesia nausea and vomiting:  none  Final Post Anesthesia Temperature Assessment:  Normothermia (36.0-37.5 degrees C)      INITIAL Post-op Vital signs:   Vitals Value Taken Time   /69 11/17/2020  1:27 PM   Temp 36.4 °C (97.5 °F) 11/17/2020  1:30 PM   Pulse 49 11/17/2020  1:31 PM   Resp 14 11/17/2020  1:30 PM   SpO2 99 % 11/17/2020  1:31 PM   Vitals shown include unvalidated device data.

## 2020-11-17 NOTE — OP NOTES
Operative Report    Patient: Yumiko Melara MRN: [id number]  SSN: [id number]    YOB: 1940  Age: 79 y.o. Sex: male       Date of Surgery: 11/17/2020     Preoperative Diagnosis: Aortic valve stenosis, etiology of cardiac valve disease unspecified [I35.0]     Postoperative Diagnosis: Aortic valve stenosis, etiology of cardiac valve disease unspecified [I35.0]     Primary Cardiothoracic Surgeon: Scooter Rodgers MD     Primary Interventional Cardiologist: Dominique Michel MD    Anesthesia: MAC     Procedure: Procedure(s):  TRANSCATHETER AORTIC VALVE REPLACEMENT   Transcatheter aortic valve replacement (26 mm Palacios Sharita 3 transcatheter aortic valve via right common femoral artery percutaneous approach). Findings:  Successful deployment of a 26 mm Palacios Sharita 3 transcatheter aortic valve via right common femoral artery percutaneous approach. good LV function with trace perivalvular regurgitation. The patient was in normal sinus rhythm at the conclusion of the procedure. Indication for Procedure: The patient is a 79 y.o. male with symptomatic severe aortic stenosis. He was not felt to be a candidate for surgical aortic valve replacement and after discussion at the multidisciplinary valve board transcatheter aortic valve replacement was recommended. After a thorough discussion of all the risks and proposed benefits, the patient agreed to proceed. Procedure in Detail:   Patient premedicated and brought to the operating suite. Time-out performed. Standard monitoring lines placed, and the patient was intubated and placed under general anesthesia without event. Transesophageal echocardiogram was performed, confirming the above valve pathology. Intravenous cefazolin was administered. Patient prepped and draped in standard, meticulous surgical fashion.  Ioban drapes were used.     Access to the left common femoral artery and vein was obtained under ultrasound guidance, and 6-Finnish sheaths were placed. A temporary transvenous right ventricular pacing wire was positioned via the femoral vein. Next, a micropuncture kit was used to gain access to the right common femoral artery and a 6-Armenian sheath was placed. This was upsized and the 14-Armenian Palacios transcatheter heart valve sheath was placed after deploying Perclose devices in a pre-close fashion.      Next, a balloon valvuloplasty was performed. The patient remained hemodynamically stable. The 26mm Sharita 3 valve was then prepped and mounted onto the delivery system in the correct orientation. This was then positioned in the ascending aorta, and then advanced across the aortic valve and deployed successfully. The details of this are dictated under a separate cover in Dr. Sandy Van report. Following deployment, an aortogram and transesophageal echocardiogram were performed, which demonstrated a trace aortic regurgitation and a well-seated, well-functioning prosthetic valve in the aortic position. The patient remained hemodynamically stable and in normal sinus rhythm. At this point, the delivery system and the sheath were removed from the right common femoral artery, and hemostasis was obtained using the Perclose sutures. Sheaths were also removed from the left common femoral artery and vein. At the conclusion of the procedure, the patient was hemodynamically stable and hemostasis was good. The patient was then transported to the CV ICU in critical but stable condition. At the end of the case, all sharp, sponge, and instrument counts were reported as being correct. Estimated Blood Loss:  minimal    Tourniquet Time: * No tourniquets in log *      Implants:   Implant Name Type Inv.  Item Serial No.  Lot No. LRB No. Used Action   VALVE AORT 26 MM TRANSCATHETER HRT VLV SYS SHRAITA 3 ULTRA - [id number]  VALVE AORT 26 MM TRANSCATHETER HRT VLV SYS SHARITA 3 ULTRA [id number]  N/A 1 Implanted               Specimens: * No specimens in log *        Drains: None                Complications: None    Counts: Sponge and needle counts were correct times two.     Signed By:  Monie Aponte MD     November 17, 2020

## 2020-11-17 NOTE — PERIOP NOTES
Bedside report received from Venkata Ley for assumption of care. Pt awake and alert, oriented to self- dementia at baseline. Mild complaint of soreness at B groin sites. VSS.

## 2020-11-17 NOTE — PROGRESS NOTES
Per CC patient admitted today for transcatheter aortic valve replacement. Patient will be reassigned upon discharge. Episode resolved.

## 2020-11-17 NOTE — PERIOP NOTES
TRANSFER - OUT REPORT:    Verbal report given to [address] on Alycia Manual  being transferred to [address] [place] [address] for routine post - op       Report consisted of patients Situation, Background, Assessment and   Recommendations(SBAR). Information from the following report(s) OR Summary, Procedure Summary, Intake/Output, MAR and Cardiac Rhythm SB was reviewed with the receiving nurse. Lines:   Double Lumen 11/17/20 Right Internal jugular (Active)   Central Line Being Utilized Yes 11/17/20 1330   Site Assessment Clean, dry, & intact 11/17/20 1330   Infiltration Assessment 0 11/17/20 1101       Peripheral IV 11/17/20 Left Hand (Active)   Site Assessment Clean, dry, & intact 11/17/20 1330   Phlebitis Assessment 0 11/17/20 1330   Infiltration Assessment 0 11/17/20 1330   Dressing Status Clean, dry, & intact 11/17/20 1330   Dressing Type Transparent;Tape 11/17/20 1330   Hub Color/Line Status Patent 11/17/20 1330       Peripheral IV 11/17/20 Anterior;Right Hand (Active)   Site Assessment Clean, dry, & intact 11/17/20 1330   Phlebitis Assessment 0 11/17/20 1330   Infiltration Assessment 0 11/17/20 1330   Dressing Status Clean, dry, & intact 11/17/20 1330   Dressing Type Transparent;Tape 11/17/20 1330   Hub Color/Line Status Patent 11/17/20 1330       Arterial Line 11/17/20 Left Radial artery (Active)   Site Assessment Clean, dry, & intact 11/17/20 1330   Dressing Status Clean, dry, & intact 11/17/20 1330   Dressing Type Transparent;Tape 11/17/20 1330   Line Status Intact and in place 11/17/20 1330   Treatment Arm board off 11/17/20 1330        Opportunity for questions and clarification was provided. Patient transported with:   Monitor  O2 @ 3lnc liters  Registered Nurse    VTE prophylaxis orders have been written for Alycia Manual. Patient and family given floor number and nurses name. Family updated re: pt status after security code verified.

## 2020-11-17 NOTE — ANESTHESIA PROCEDURE NOTES
Arterial Line Placement    Start time: 11/17/2020 9:19 AM  End time: 11/17/2020 9:30 AM  Performed by:  Smitha Meneses CRNA  Authorized by: Maryellen Landis MD     Pre-Procedure  Indications:  Arterial pressure monitoring and blood sampling  Preanesthetic Checklist: patient identified, risks and benefits discussed, anesthesia consent, site marked, patient being monitored, timeout performed and patient being monitored    Timeout Time: 09:19        Procedure:   Prep:  ChloraPrep  Seldinger Technique?: Yes    Orientation:  Left  Location:  Radial artery  Catheter size:  20 G  Number of attempts:  1  Cont Cardiac Output Sensor: Yes      Assessment:   Post-procedure:  Line secured and sterile dressing applied  Patient Tolerance:  Patient tolerated the procedure well with no immediate complications

## 2020-11-17 NOTE — PROGRESS NOTES
's pre-procedure visit requested by patient. Conveyed care and concern for patient and family. Offered prayer as requested for patient, family, and staff.     Madie Vila MDiv, BS  Board Certified

## 2020-11-17 NOTE — BRIEF OP NOTE
Brief Postoperative Note    Patient: Benny Ellis  YOB: 1940  MRN: [id number]    Date of Procedure: 11/17/2020     Pre-Op Diagnosis: Aortic valve stenosis, etiology of cardiac valve disease unspecified [I35.0]    Post-Op Diagnosis: Same as preoperative diagnosis. Procedure(s):  TRANSCATHETER AORTIC VALVE REPLACEMENT    Surgeon(s):  MD Inez Chun MD    Surgical Assistant: None    Anesthesia: MAC     Estimated Blood Loss (mL): Minimal    Complications: None    Specimens: * No specimens in log *     Implants:   Implant Name Type Inv.  Item Serial No.  Lot No. LRB No. Used Action   VALVE AORT 26 MM TRANSCATHETER HRT VLV SYS LEATHA 3 ULTRA - [id number]  VALVE AORT 26 MM TRANSCATHETER HRT VLV SYS LEATHA 3 ULTRA [id number] [id number]  N/A 1 Implanted       Drains:   [REMOVED] Condom Catheter 11/14/20 (Removed)       Findings: as    Electronically Signed by Alisa Carrillo MD on 11/17/2020 at 10:44 AM

## 2020-11-18 PROBLEM — Z95.2 S/P TAVR (TRANSCATHETER AORTIC VALVE REPLACEMENT): Chronic | Status: ACTIVE | Noted: 2020-11-17

## 2020-11-18 LAB
ANION GAP SERPL CALC-SCNC: 7 MMOL/L (ref 7–16)
ATRIAL RATE: 91 BPM
BASOPHILS # BLD: 0 K/UL (ref 0–0.2)
BASOPHILS NFR BLD: 0 % (ref 0–2)
BUN SERPL-MCNC: 16 MG/DL (ref 8–23)
CALCIUM SERPL-MCNC: 8.3 MG/DL (ref 8.3–10.4)
CALCULATED P AXIS, ECG09: 30 DEGREES
CALCULATED R AXIS, ECG10: -21 DEGREES
CALCULATED T AXIS, ECG11: 120 DEGREES
CHLORIDE SERPL-SCNC: 107 MMOL/L (ref 98–107)
CO2 SERPL-SCNC: 25 MMOL/L (ref 21–32)
CREAT SERPL-MCNC: 0.93 MG/DL (ref 0.8–1.5)
DIAGNOSIS, 93000: NORMAL
DIFFERENTIAL METHOD BLD: ABNORMAL
EOSINOPHIL # BLD: 0.2 K/UL (ref 0–0.8)
EOSINOPHIL NFR BLD: 2 % (ref 0.5–7.8)
ERYTHROCYTE [DISTWIDTH] IN BLOOD BY AUTOMATED COUNT: 14.4 % (ref 11.9–14.6)
GLUCOSE SERPL-MCNC: 92 MG/DL (ref 65–100)
HCT VFR BLD AUTO: 34.9 % (ref 41.1–50.3)
HGB BLD-MCNC: 11.3 G/DL (ref 13.6–17.2)
IMM GRANULOCYTES # BLD AUTO: 0 K/UL (ref 0–0.5)
IMM GRANULOCYTES NFR BLD AUTO: 0 % (ref 0–5)
LYMPHOCYTES # BLD: 1.4 K/UL (ref 0.5–4.6)
LYMPHOCYTES NFR BLD: 13 % (ref 13–44)
MCH RBC QN AUTO: 30.1 PG (ref 26.1–32.9)
MCHC RBC AUTO-ENTMCNC: 32.4 G/DL (ref 31.4–35)
MCV RBC AUTO: 93.1 FL (ref 79.6–97.8)
MONOCYTES # BLD: 1.4 K/UL (ref 0.1–1.3)
MONOCYTES NFR BLD: 13 % (ref 4–12)
NEUTS SEG # BLD: 7.8 K/UL (ref 1.7–8.2)
NEUTS SEG NFR BLD: 72 % (ref 43–78)
NRBC # BLD: 0 K/UL (ref 0–0.2)
P-R INTERVAL, ECG05: 216 MS
PLATELET # BLD AUTO: 189 K/UL (ref 150–450)
PMV BLD AUTO: 9.4 FL (ref 9.4–12.3)
POTASSIUM SERPL-SCNC: 3.8 MMOL/L (ref 3.5–5.1)
Q-T INTERVAL, ECG07: 392 MS
QRS DURATION, ECG06: 130 MS
QTC CALCULATION (BEZET), ECG08: 482 MS
RBC # BLD AUTO: 3.75 M/UL (ref 4.23–5.6)
SODIUM SERPL-SCNC: 139 MMOL/L (ref 136–145)
VENTRICULAR RATE, ECG03: 91 BPM
WBC # BLD AUTO: 10.8 K/UL (ref 4.3–11.1)

## 2020-11-18 PROCEDURE — 74011250637 HC RX REV CODE- 250/637: Performed by: PHYSICIAN ASSISTANT

## 2020-11-18 PROCEDURE — 85025 COMPLETE CBC W/AUTO DIFF WBC: CPT

## 2020-11-18 PROCEDURE — 99232 SBSQ HOSP IP/OBS MODERATE 35: CPT | Performed by: INTERNAL MEDICINE

## 2020-11-18 PROCEDURE — 65660000004 HC RM CVT STEPDOWN

## 2020-11-18 PROCEDURE — 74011250637 HC RX REV CODE- 250/637: Performed by: INTERNAL MEDICINE

## 2020-11-18 PROCEDURE — C8929 TTE W OR WO FOL WCON,DOPPLER: HCPCS

## 2020-11-18 PROCEDURE — 36415 COLL VENOUS BLD VENIPUNCTURE: CPT

## 2020-11-18 PROCEDURE — 74011000250 HC RX REV CODE- 250: Performed by: INTERNAL MEDICINE

## 2020-11-18 PROCEDURE — 74011000302 HC RX REV CODE- 302: Performed by: NURSE PRACTITIONER

## 2020-11-18 PROCEDURE — 80048 BASIC METABOLIC PNL TOTAL CA: CPT

## 2020-11-18 PROCEDURE — 74011250636 HC RX REV CODE- 250/636: Performed by: INTERNAL MEDICINE

## 2020-11-18 PROCEDURE — 2709999900 HC NON-CHARGEABLE SUPPLY

## 2020-11-18 PROCEDURE — 93005 ELECTROCARDIOGRAM TRACING: CPT | Performed by: INTERNAL MEDICINE

## 2020-11-18 PROCEDURE — 86580 TB INTRADERMAL TEST: CPT | Performed by: NURSE PRACTITIONER

## 2020-11-18 RX ORDER — ROSUVASTATIN CALCIUM 20 MG/1
20 TABLET, COATED ORAL DAILY
Qty: 30 TAB | Refills: 3 | Status: SHIPPED | OUTPATIENT
Start: 2020-11-18 | End: 2020-11-24 | Stop reason: SDUPTHER

## 2020-11-18 RX ORDER — ROSUVASTATIN CALCIUM 20 MG/1
20 TABLET, COATED ORAL DAILY
Status: DISCONTINUED | OUTPATIENT
Start: 2020-11-18 | End: 2020-11-24 | Stop reason: HOSPADM

## 2020-11-18 RX ORDER — LISINOPRIL 5 MG/1
5 TABLET ORAL DAILY
Qty: 30 TAB | Refills: 3 | Status: SHIPPED | OUTPATIENT
Start: 2020-11-18 | End: 2020-11-24 | Stop reason: SDUPTHER

## 2020-11-18 RX ADMIN — CEFAZOLIN SODIUM 2 G: 100 INJECTION, POWDER, LYOPHILIZED, FOR SOLUTION INTRAVENOUS at 02:36

## 2020-11-18 RX ADMIN — LISINOPRIL 5 MG: 5 TABLET ORAL at 09:52

## 2020-11-18 RX ADMIN — PERFLUTREN 1 ML: 6.52 INJECTION, SUSPENSION INTRAVENOUS at 10:11

## 2020-11-18 RX ADMIN — Medication 10 ML: at 23:00

## 2020-11-18 RX ADMIN — CLOPIDOGREL BISULFATE 75 MG: 75 TABLET ORAL at 09:52

## 2020-11-18 RX ADMIN — ACETAMINOPHEN 650 MG: 325 TABLET, FILM COATED ORAL at 17:57

## 2020-11-18 RX ADMIN — TUBERCULIN PURIFIED PROTEIN DERIVATIVE 5 UNITS: 5 INJECTION, SOLUTION INTRADERMAL at 22:58

## 2020-11-18 RX ADMIN — Medication 10 ML: at 14:00

## 2020-11-18 RX ADMIN — ACETAMINOPHEN 650 MG: 325 TABLET, FILM COATED ORAL at 14:06

## 2020-11-18 RX ADMIN — CARVEDILOL 6.25 MG: 3.12 TABLET, FILM COATED ORAL at 09:51

## 2020-11-18 RX ADMIN — TAMSULOSIN HYDROCHLORIDE 0.4 MG: 0.4 CAPSULE ORAL at 09:52

## 2020-11-18 RX ADMIN — ROSUVASTATIN 20 MG: 20 TABLET, FILM COATED ORAL at 09:52

## 2020-11-18 RX ADMIN — HYDROCODONE BITARTRATE AND ACETAMINOPHEN 1 TABLET: 5; 325 TABLET ORAL at 02:30

## 2020-11-18 RX ADMIN — Medication 1 AMPULE: at 22:58

## 2020-11-18 RX ADMIN — Medication 1 AMPULE: at 09:52

## 2020-11-18 RX ADMIN — ASPIRIN 81 MG CHEWABLE TABLET 81 MG: 81 TABLET CHEWABLE at 09:52

## 2020-11-18 RX ADMIN — CARVEDILOL 6.25 MG: 3.12 TABLET, FILM COATED ORAL at 17:57

## 2020-11-18 NOTE — PROGRESS NOTES
Patient unable to stand to safely stand and pivot to wheelchair for discharge. Attempt made with 3 nurses assisting patient. Charge nurse notified.  Valve coordinator German Raya contacted, states she will speak to Dr. Malick Mitchell

## 2020-11-18 NOTE — ROUTINE PROCESS
Cardiac Rehab: Attempted to speak with patient regarding referral to cardiac rehab. Patient meets admission criteria based on TAVR (11/17/20). Pt with confusion and lethargy noted this AM. Chart review completed. Notes indicate pt with dementia and debility prior to procedure. I will not pursue for Cardiac Rehab at this time. His Cardiologist is Dr. Wing Reed. Thank you, Karina Andrews, MALIKN, RN Cardiopulmonary Rehabilitation Nurse Liaison [facility]

## 2020-11-18 NOTE — PROGRESS NOTES
Discharge instructions were reviewed with patient and daughter. An opportunity was given for questions. All medications were reviewed, and information was given on the new medications. Patient and daughter verbalized understanding, and have no questions at this time.

## 2020-11-18 NOTE — DISCHARGE SUMMARY
[facility] Cardiology Discharge Summary     Patient ID:  Bridger Quiroz  [id number]  85 y.o.  1940    Admit date: 11/17/2020    Discharge date:  11/18/2020    Admitting Physician: Cathy Herron MD     Discharge Physician: Omari Alba PA-C/Dr. 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    Syncope and collapse 11/13/2020    Dysuria 11/13/2020    Nonrheumatic aortic valve stenosis 09/11/2020    Chronic combined systolic and diastolic congestive heart failure ([facility] 09/11/2020    Gastroparesis 01/28/2019    S/P angioplasty with stent 10/20/2017    Environmental allergies 08/16/2017    Chronic low back pain 08/16/2017    Pure hypercholesterolemia 06/27/2016    Orthostatic hypotension 06/13/2016    Abnormal cardiovascular stress test 06/13/2016    Seizure disorder ([facility] 05/09/2016    Coronary artery disease involving native coronary artery of native heart without angina pectoris 05/09/2016       Cardiology Procedures this admission:  Transcatheter aortic valve replacement    Hospital Course: Patient is an 79 y.o. y.o with severe symptomatic aortic stenosis. After a thorough preoperative evaluation by multidisciplinary valve board, the patient was felt to be elevated risk for surgical AVR. Based on this elevated risk, it was recommended patient proceed with transcatheter aortic valve replacement. This was discussed with patient and after a thorough discussion of all the risks and proposed benefits, the patient agreed to proceed. He was admitted for planned TAVR on 11/17. The patient underwent successful balloon valvuloplasty and transcatheter aortic valve replacement with 26 mm Pal[place]ios Sharita S3 valve via a right femoral aproach. The patient tolerated the procedure well and was taken to the telemetry floor for recovery. The following morning, patient was up feeling well without any complaints of chest pain or shortness of breath. Patient's labs were stable. Echocardiogram showed normal function of bioprosthesis. Patient was seen and examined by Dr. Vickey Plata and determined stable and ready for discharge. Patient was instructed on the importance of medication compliance including dual anti-platelet therapy for at least 6 months. Bing has known dementia at baseline and is being DC home with dtr. The patient will have transitional care follow up with [address] [place] [address] [address] Fidelia Robertson on Monday 11/23 at 3:15pm for a transitional appt at the [facility]  A repeat echocardiogram will be obtained in 1 month. DISPOSITION: The patient is being discharged home in stable condition on a low saturated fat, low cholesterol and low salt diet. The patient is instructed to advance activities as tolerated to the limit of fatigue or shortness of breath. The patient is instructed to avoid lifting anything heavier than 10 lbs for 2 weeks. The patient is instructed to avoid any straining, stooping or squatting for 2 weeks. The patient is instructed not to drive for 1 week. The patient is instructed to watch the groin site for bleeding/oozing; if seen, the patient is instructed to apply firm pressure with a clean cloth and call [address] [place] [address] Cardiology at [phone]. The patient is instructed to watch for signs of infection which include: increasing area of redness, fever/hot to touch or purulent drainage at the groin site. The patient is instructed not to soak in a bathtub for 7-10 days, but is cleared to shower. The patient is instructed to return to the ER immediately for any severe pain, color change, or temperature change in leg.      The patient is informed that prophylaxis for bacterial endocarditis is recommended for high-risk procedures such as all dental procedures that involve manipulation of gingival tissue, the periapical region of teeth, or perforation of the oral mucosa. Discharge Exam:   Visit Vitals  BP (!) 168/78   Pulse (!) 105   Temp 99.8 °F (37.7 °C)   Resp 20   Ht 6' (1.829 m)   Wt 92.7 kg (204 lb 6.4 oz)   SpO2 93%   BMI 27.72 kg/m²     Patient has been seen by Dr. Daija Olivares: see his progress note for exam details.     Recent Results (from the past 24 hour(s))   EKG, 12 LEAD, INITIAL    Collection Time: 11/17/20 11:35 AM   Result Value Ref Range    Ventricular Rate 58 BPM    Atrial Rate 58 BPM    P-R Interval 270 ms    QRS Duration 136 ms    Q-T Interval 522 ms    QTC Calculation (Bezet) 512 ms    Calculated P Axis 56 degrees    Calculated R Axis 22 degrees    Calculated T Axis 145 degrees    Diagnosis       Sinus bradycardia with 1st degree A-V block  Left bundle branch block Non-specific ST-t wave changes  Abnormal ECG  When compared with ECG of 14-NOV-2020 08:25,  Left bundle branch block is now Present  Confirmed by Karolina Moore MD (), GALINA HARRISON ([id number]) on 11/17/2020 3:21:36 PM     EKG, 12 LEAD, SUBSEQUENT    Collection Time: 11/17/20  3:41 PM   Result Value Ref Range    Ventricular Rate 71 BPM    Atrial Rate 71 BPM    P-R Interval 250 ms    QRS Duration 132 ms    Q-T Interval 478 ms    QTC Calculation (Bezet) 519 ms    Calculated P Axis 67 degrees    Calculated R Axis 36 degrees    Calculated T Axis -170 degrees    Diagnosis       Sinus rhythm with 1st degree A-V block  Left bundle branch block  Abnormal ECG  When compared with ECG of 17-NOV-2020 11:35,  No significant change was found  Confirmed by SHANI MCRAE ()RADHA ([id number]) on 11/17/2020 5:18:04 PM     GLUCOSE, POC    Collection Time: 11/17/20  8:57 PM   Result Value Ref Range    Glucose (POC) 101 (H) 65 - 586 mg/dL   METABOLIC PANEL, BASIC    Collection Time: 11/18/20  3:11 AM   Result Value Ref Range    Sodium 139 136 - 145 mmol/L    Potassium 3.8 3.5 - 5.1 mmol/L    Chloride 107 98 - 107 mmol/L    CO2 25 21 - 32 mmol/L    Anion gap 7 7 - 16 mmol/L    Glucose 92 65 - 100 mg/dL    BUN 16 8 - 23 MG/DL    Creatinine 0.93 0.8 - 1.5 MG/DL    GFR est AA >60 >60 ml/min/1.73m2    GFR est non-AA >60 >60 ml/min/1.73m2    Calcium 8.3 8.3 - 10.4 MG/DL   CBC WITH AUTOMATED DIFF    Collection Time: 11/18/20  3:11 AM   Result Value Ref Range    WBC 10.8 4.3 - 11.1 K/uL    RBC 3.75 (L) 4.23 - 5.6 M/uL    HGB 11.3 (L) 13.6 - 17.2 g/dL    HCT 34.9 (L) 41.1 - 50.3 %    MCV 93.1 79.6 - 97.8 FL    MCH 30.1 26.1 - 32.9 PG    MCHC 32.4 31.4 - 35.0 g/dL    RDW 14.4 11.9 - 14.6 %    PLATELET 337 477 - 524 K/uL    MPV 9.4 9.4 - 12.3 FL    ABSOLUTE NRBC 0.00 0.0 - 0.2 K/uL    DF AUTOMATED      NEUTROPHILS 72 43 - 78 %    LYMPHOCYTES 13 13 - 44 %    MONOCYTES 13 (H) 4.0 - 12.0 %    EOSINOPHILS 2 0.5 - 7.8 %    BASOPHILS 0 0.0 - 2.0 %    IMMATURE GRANULOCYTES 0 0.0 - 5.0 %    ABS. NEUTROPHILS 7.8 1.7 - 8.2 K/UL    ABS. LYMPHOCYTES 1.4 0.5 - 4.6 K/UL    ABS. MONOCYTES 1.4 (H) 0.1 - 1.3 K/UL    ABS. EOSINOPHILS 0.2 0.0 - 0.8 K/UL    ABS. BASOPHILS 0.0 0.0 - 0.2 K/UL    ABS. IMM. GRANS. 0.0 0.0 - 0.5 K/UL         Patient Instructions:   Current Discharge Medication List      START taking these medications    Details   lisinopriL (PRINIVIL, ZESTRIL) 5 mg tablet Take 1 Tab by mouth daily. Qty: 30 Tab, Refills: 3      rosuvastatin (CRESTOR) 20 mg tablet Take 1 Tab by mouth daily. Qty: 30 Tab, Refills: 3         CONTINUE these medications which have NOT CHANGED    Details   tamsulosin (FLOMAX) 0.4 mg capsule TAKE 1 CAPSULE BY MOUTH ONCE DAILY WITH SUPPER  Qty: 90 Cap, Refills: 0      carvediloL (COREG) 6.25 mg tablet Take 1 Tab by mouth two (2) times daily (with meals). Qty: 60 Tab, Refills: 3      clopidogreL (Plavix) 75 mg tab Take 75 mg by mouth daily. Indications: continued after cath per Dr. Calin Graf      acetaminophen (TYLENOL) 500 mg tablet Take 1,000 mg by mouth.       DIGEST PROBIOTIC, S.BOULARDII, 250 mg capsule TAKE 1 CAPSULE BY MOUTH TWICE A DAY      ergocalciferol (ERGOCALCIFEROL) 50,000 unit capsule TAKE 1 CAPSULE BY MOUTH ONCE EVERY SEVEN DAYS  Qty: 12 Cap, Refills: 11    Comments: Please consider 90 day supplies to promote better adherence      aspirin delayed-release 81 mg tablet Take 81 mg by mouth daily. nystatin (MYCOSTATIN) topical cream Apply  to affected area two (2) times a day. Qty: 45 g, Refills: 3      nitroglycerin (NITROSTAT) 0.4 mg SL tablet 1 Tab by SubLINGual route every five (5) minutes as needed for Chest Pain.   Qty: 1 Bottle, Refills: 11               Signed:  JOSE Chaney  11/18/2020  8:06 AM

## 2020-11-18 NOTE — ROUTINE PROCESS
TRANSFER - IN REPORT: 
 
Verbal report received from Noris Cisneros RN(name) on Ines Stover  being received from PACU(unit) for routine post - op Report consisted of patients Situation, Background, Assessment and  
Recommendations(SBAR). Information from the following report(s) SBAR, Kardex, Procedure Summary, Intake/Output, MAR, Recent Results, Med Rec Status and Cardiac Rhythm NSR 1st Degree was reviewed with the receiving nurse. Opportunity for questions and clarification was provided. Assessment completed upon patients arrival to unit and care assumed. Dual skin assessment completed with RN. All skin inspected. No breakdown noted. Heels intact. Bilateral groin sites c/d/i.

## 2020-11-18 NOTE — PROGRESS NOTES
[facility] CARDIOLOGY PROGRESS NOTE           11/18/2020 7:31 AM    Admit Date: 11/17/2020      Subjective:   Patient is stable post procedure. No rhythm issues overnight. Mildly confused this AM.      ROS:  Cardiovascular:  As noted above    Objective:      Vitals:    11/17/20 1932 11/17/20 2256 11/17/20 2259 11/18/20 0348   BP: 135/81 129/74  138/67   Pulse: 69 84 87 97   Resp: 16 18 18   Temp: 97.4 °F (36.3 °C) 99 °F (37.2 °C)  98.1 °F (36.7 °C)   SpO2: 98% 98%  92%   Weight:       Height:           Physical Exam:  General-No Acute Distress  Neck- supple, no JVD  CV- regular rate and rhythm no MRG  Lung- clear bilaterally  Abd- soft, nontender, nondistended  Ext- no edema bilaterally. Skin- warm and dry    Data Review:   Recent Labs     11/18/20  0311      K 3.8   BUN 16   CREA 0.93   GLU 92   WBC 10.8   HGB 11.3*   HCT 34.9*          Assessment/Plan:     Principal Problem:    S/P TAVR (transcatheter aortic valve replacement) (11/17/2020)    Patient has done well. He has a new LBBB but no issues with heart block or bradycardia. Resume home medications. Stable on ASA and clopidogrel. Echo is pending. Bilateral groin sites are healing well. Patient to be out of bed most of day. Suspect confusion is normal for his degree of dementia. Hopefully home later today. Active Problems:    Coronary artery disease involving native coronary artery of native heart without angina pectoris (5/9/2016)    Diffuse CAD and will manage medically. He has had no angina. He is minimally active at home. Orthostatic hypotension (6/13/2016)    This is stable       Pure hypercholesterolemia (6/27/2016)    Start rosuvastatin 20mg daily       Chronic combined systolic and diastolic congestive heart failure ([address] (9/11/2020)    EF 40-45%. Medical therapy appropriate. No volume issues. Syncope and collapse (11/13/2020)    No recurrence.             Sebastián Money, MD  11/18/2020 7:31 AM

## 2020-11-19 LAB
ANION GAP SERPL CALC-SCNC: 9 MMOL/L (ref 7–16)
ATRIAL RATE: 78 BPM
BUN SERPL-MCNC: 14 MG/DL (ref 8–23)
CALCIUM SERPL-MCNC: 8.4 MG/DL (ref 8.3–10.4)
CALCULATED R AXIS, ECG10: -40 DEGREES
CALCULATED T AXIS, ECG11: 115 DEGREES
CHLORIDE SERPL-SCNC: 106 MMOL/L (ref 98–107)
CO2 SERPL-SCNC: 25 MMOL/L (ref 21–32)
CREAT SERPL-MCNC: 0.78 MG/DL (ref 0.8–1.5)
DIAGNOSIS, 93000: NORMAL
ERYTHROCYTE [DISTWIDTH] IN BLOOD BY AUTOMATED COUNT: 14.6 % (ref 11.9–14.6)
GLUCOSE SERPL-MCNC: 84 MG/DL (ref 65–100)
HCT VFR BLD AUTO: 31.6 % (ref 41.1–50.3)
HGB BLD-MCNC: 10.3 G/DL (ref 13.6–17.2)
MCH RBC QN AUTO: 29.8 PG (ref 26.1–32.9)
MCHC RBC AUTO-ENTMCNC: 32.6 G/DL (ref 31.4–35)
MCV RBC AUTO: 91.3 FL (ref 79.6–97.8)
MM INDURATION POC: 0 MM (ref 0–5)
NRBC # BLD: 0 K/UL (ref 0–0.2)
PLATELET # BLD AUTO: 151 K/UL (ref 150–450)
PMV BLD AUTO: 9.8 FL (ref 9.4–12.3)
POTASSIUM SERPL-SCNC: 3.3 MMOL/L (ref 3.5–5.1)
PPD POC: NEGATIVE NEGATIVE
Q-T INTERVAL, ECG07: 410 MS
QRS DURATION, ECG06: 130 MS
QTC CALCULATION (BEZET), ECG08: 507 MS
RBC # BLD AUTO: 3.46 M/UL (ref 4.23–5.6)
SODIUM SERPL-SCNC: 140 MMOL/L (ref 136–145)
VENTRICULAR RATE, ECG03: 92 BPM
WBC # BLD AUTO: 10 K/UL (ref 4.3–11.1)

## 2020-11-19 PROCEDURE — 74011250637 HC RX REV CODE- 250/637: Performed by: NURSE PRACTITIONER

## 2020-11-19 PROCEDURE — 2709999900 HC NON-CHARGEABLE SUPPLY

## 2020-11-19 PROCEDURE — 93005 ELECTROCARDIOGRAM TRACING: CPT | Performed by: INTERNAL MEDICINE

## 2020-11-19 PROCEDURE — 65660000004 HC RM CVT STEPDOWN

## 2020-11-19 PROCEDURE — 74011250637 HC RX REV CODE- 250/637: Performed by: PHYSICIAN ASSISTANT

## 2020-11-19 PROCEDURE — 36415 COLL VENOUS BLD VENIPUNCTURE: CPT

## 2020-11-19 PROCEDURE — 80048 BASIC METABOLIC PNL TOTAL CA: CPT

## 2020-11-19 PROCEDURE — 74011250637 HC RX REV CODE- 250/637: Performed by: INTERNAL MEDICINE

## 2020-11-19 PROCEDURE — 85027 COMPLETE CBC AUTOMATED: CPT

## 2020-11-19 PROCEDURE — 97162 PT EVAL MOD COMPLEX 30 MIN: CPT

## 2020-11-19 PROCEDURE — 99232 SBSQ HOSP IP/OBS MODERATE 35: CPT | Performed by: INTERNAL MEDICINE

## 2020-11-19 RX ORDER — POTASSIUM CHLORIDE 20 MEQ/1
40 TABLET, EXTENDED RELEASE ORAL
Status: COMPLETED | OUTPATIENT
Start: 2020-11-19 | End: 2020-11-19

## 2020-11-19 RX ADMIN — Medication 10 ML: at 22:47

## 2020-11-19 RX ADMIN — Medication 10 ML: at 06:55

## 2020-11-19 RX ADMIN — CLOPIDOGREL BISULFATE 75 MG: 75 TABLET ORAL at 09:29

## 2020-11-19 RX ADMIN — CARVEDILOL 6.25 MG: 3.12 TABLET, FILM COATED ORAL at 09:28

## 2020-11-19 RX ADMIN — CARVEDILOL 6.25 MG: 3.12 TABLET, FILM COATED ORAL at 18:24

## 2020-11-19 RX ADMIN — Medication 1 AMPULE: at 09:29

## 2020-11-19 RX ADMIN — TAMSULOSIN HYDROCHLORIDE 0.4 MG: 0.4 CAPSULE ORAL at 09:28

## 2020-11-19 RX ADMIN — POTASSIUM CHLORIDE 40 MEQ: 20 TABLET, EXTENDED RELEASE ORAL at 06:54

## 2020-11-19 RX ADMIN — LISINOPRIL 5 MG: 5 TABLET ORAL at 09:28

## 2020-11-19 RX ADMIN — ROSUVASTATIN 20 MG: 20 TABLET, FILM COATED ORAL at 09:29

## 2020-11-19 RX ADMIN — Medication 1 AMPULE: at 22:47

## 2020-11-19 RX ADMIN — ASPIRIN 81 MG CHEWABLE TABLET 81 MG: 81 TABLET CHEWABLE at 09:29

## 2020-11-19 NOTE — PROGRESS NOTES
Patient moved from recliner to bed with max assist x 3. The patient could not assist with standing. The patient leaned back and stepped forward. The patient is still demonstrating significant posterior trunk lean. Patients brief was checked every 2 hours and changed at this time.

## 2020-11-19 NOTE — PROGRESS NOTES
Care Management Interventions  PCP Verified by CM: Yes(Ana Medina)  Mode of Transport at Discharge: Other (see comment)(family)  Transition of Care Consult (CM Consult): Discharge Planning, [address]: No  Reason Outside [place]: Patient already serviced by other home care/hospice agency  Discharge Durable Medical Equipment: No  Physical Therapy Consult: Yes  Occupational Therapy Consult: No  Speech Therapy Consult: No  Current Support Network: Other, Lives with Caregiver(Lives with pt daughter)  Confirm Follow Up Transport: Family  The Plan for Transition of Care is Related to the Following Treatment Goals : home with home health   The Patient and/or Patient Representative was Provided with a Choice of Provider and Agrees with the Discharge Plan?: Yes  Name of the Patient Representative Who was Provided with a Choice of Provider and Agrees with the Discharge Plan: Mrs. Rupert Manzo - pt daughter  Freedom of Choice List was Provided with Basic Dialogue that Supports the Patient's Individualized Plan of Care/Goals, Treatment Preferences and Shares the Quality Data Associated with the Providers?: Yes  Bunch Resource Information Provided?: No  Discharge Location  Discharge Placement: Home with home health      This CM completed assessment with pt daughter, Caleb Gallego, this day due to nursing reports of pt with increased confusion/disorientation. Pt daughter verified pt's PCP, insurance, emergency contact, and home address. Caleb Gallego reports no difficulty obtaining pt meds in the community and manages all his meds at home. Pt lives at home with Caleb Gallego, her  and son. There are 3 steps to enter. His home DME includes a cane, walker, wheelchair, and BSC. She confirms that at baseline prior to admission pt required assistance with most all of his ADLs. Pt daughter and family assists pt with bathing, toileting, and dressing. Pt does not drive.      We discussed discharge planning this day. [facility] plan is for pt to return home with family when stable for discharge. Discussed the recommendation of home health at discharge - she is agreeable to referral being made. She reports they have worked with [address] [place] [address] and would like referral made there. This CM to send referral.  No additional CM needs at this time. Will continue to follow and update as needed.

## 2020-11-19 NOTE — PROGRESS NOTES
Problem: Mobility Impaired (Adult and Pediatric)  Goal: *Acute Goals and Plan of Care (Insert Text)  Note: Goals:  (1.)Mr. Clayton Carney will move from supine to sit and sit to supine , scoot up and down, and roll side to side with MINIMAL ASSIST within 3 treatment day(s). (2.)Mr. Clayton Carney will transfer from bed to chair and chair to bed with MINIMAL ASSIST using the least restrictive device within 3 treatment day(s). (3.)Mr. Clayton Carney will stand for 2-3 minutes with RW for UE support with upright standing posture within 3 treatment day(s). PHYSICAL THERAPY: Initial Assessment, Daily Note, and PM 11/19/2020  INPATIENT:    [place] / Plan[place] / Product Type: Medicare /       NAME/AGE/GENDER: Molly Bañuelos is a 79 y.o. 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 Clayton Carney is an 79year old WF s/p Aortic Valve Replacement secondary to cardiac valve disease. His PMH includes Aortic stenosis, Arthritis, CAD, Claustrophobia, GERD, Heart failure, Ill-defined condition, Sarcoidosis of lymph nodes, Seizures ([address] Shortness of breath, Syncope and collapse, and Vertigo. He presents today sitting up in the bedside chair with his daughter at the bedside. He is oriented to his name and he knows his daughter.   She reports he lives with her and her family in a 1 level home with 3 steps to enter.  His PLOF has been primarily at a w/c level only standing and assisting with transitioning from the w/c to bed, toilet and chair. They have equipment at home for use as needed. The daughter reports he does not really ambulate at home. His BLE AROM is WFLs. Sit to stand was max assist with patient needing to have manual/verbal cues on placement of his UEs to push up with 1 hand on the RW and the other pushing from the chair. Once up he requires moderate assist to keep his center of gravity forward versus posterior. He tolerated standing with the RW with moderate assist for 45 seconds then he needed to sit. He practiced standing 2 more times with max assist for sit to stand and moderate assist to maintain standing and each time it was easier and with improved technique. He returned sit and was positioned for comfort with the call light and his personal items in reach. His daughter remained at the bedside. Mr. Max Palencia would benefit from continued skilled PT to maximize his functional abilities while in the hospital.  His daughter states that they have a routine at home and he has a specific way of getting up into standing to help them. This section established at most recent assessment   PROBLEM LIST (Impairments causing functional limitations):  Decreased Strength  Decreased ADL/Functional Activities  Decreased Transfer Abilities  Decreased Ambulation Ability/Technique  Decreased Activity Tolerance  Decreased Cognition   INTERVENTIONS PLANNED: (Benefits and precautions of physical therapy have been discussed with the patient.)  Bed Mobility  Gait Training  Therapeutic Activites  Therapeutic Exercise/Strengthening  Transfer Training     TREATMENT PLAN: Frequency/Duration: 3 times a week for duration of hospital stay  Rehabilitation Potential For Stated Goals: [facility] (at time of discharge pending progress):    Placement:   It is my opinion, based on this patient's performance to date, that Mr. Luiz Terry may benefit from [address] after discharge due to the functional deficits listed above that are likely to improve with skilled rehabilitation because he/she has multiple medical issues that affect his/her functional mobility in the community. Equipment:   None at this time              HISTORY:   History of Present Injury/Illness (Reason for Referral):  Patient is a 79 y.o. male presents with low flow/low gradient AS. EF 40-45%. He has diffuse pattern CAD and critical LCx disease. He was referred for CABG/AVR but due to severe debility he was determined he would be best severed with TAVR. CAD reviewed and felt ostial LCx disease was too high risk for PCI and he is being managed medically. He was admitted last week with syncope thought to be due to orthostasis and vagal spell. He has recovered and now here for TAVR. Past Medical History/Comorbidities:   Mr. Luiz Terry  has a past medical history of Aortic stenosis, Arthritis, CAD (coronary artery disease), Claustrophobia, GERD (gastroesophageal reflux disease), Heart failure ([address] Ill-defined condition, Sarcoidosis of lymph nodes, Seizures ([address] Shortness of breath, Syncope and collapse, and Vertigo. Mr. Luiz Terry  has a past surgical history that includes hx lymph node dissection; hx cataract removal (Bilateral); hx colonoscopy; pr cardiac surg procedure unlist; hx lap cholecystectomy; hx urological (2015); hx shoulder arthroscopy; and hx knee arthroscopy.   Social History/Living Environment:   Home Environment: Private residence  # Steps to Enter: 3  One/Two Story Residence: One story  Living Alone: No  Support Systems: Child(nona)  Patient Expects to be Discharged to[de-identified] Private residence  Current DME Used/Available at [facility]: Wheelchair, Zuleika Adi, [place], Adolfo Mealing, straight, Commode, bedside  Prior Level of Function/Work/Activity:  Per family member, patient does not ambulate at home but does assist with transitioning from bed to/from W/C and/or BSC to/from W/C     Number of Personal Factors/Comorbidities that affect the Plan of Care: 3+: HIGH COMPLEXITY   EXAMINATION:   Most Recent Physical Functioning:   Gross Assessment:  AROM: Generally decreased, functional  PROM: Generally decreased, functional  Strength: Generally decreased, functional  Tone: Normal  Sensation: Intact               Posture:  Posture (WDL): Exceptions to WDL  Posture Assessment: Forward head, Rounded shoulders, Trunk flexion  Balance:  Sitting: Impaired  Sitting - Static: Fair (occasional); Good (unsupported); Unsupported  Sitting - Dynamic: Fair (occasional)  Standing: Impaired  Standing - Static: Poor;Fair;Constant support  Standing - Dynamic : Poor Bed Mobility:  Scooting: Moderate assistance  Wheelchair Mobility:     Transfers:  Sit to Stand: Maximum assistance  Stand to Sit: Moderate assistance  Gait:            Body Structures Involved:  Heart  Thoracic Cage Body Functions Affected:  Cardio  Movement Related Activities and Participation Affected:  General Tasks and Demands  Mobility  Self Care   Number of elements that affect the Plan of Care: 3: MODERATE COMPLEXITY   CLINICAL PRESENTATION:   Presentation: Evolving clinical presentation with changing clinical characteristics: MODERATE COMPLEXITY   CLINICAL DECISION MAKIN [facility] [place] [facility] Short Form  How much difficulty does the patient currently have. .. Unable A Lot A Little None   1. Turning over in bed (including adjusting bedclothes, sheets and blankets)? [] 1   [] 2   [x] 3   [] 4   2. Sitting down on and standing up from a chair with arms ( e.g., wheelchair, bedside commode, etc.)   [] 1   [x] 2   [] 3   [] 4   3. Moving from lying on back to sitting on the side of the bed? [] 1   [x] 2   [] 3   [] 4   How much help from another person does the patient currently need. .. Total A Lot A Little None   4.   Moving to and from a bed to a chair (including a wheelchair)? [x] 1   [] 2   [] 3   [] 4   5. Need to walk in hospital room? [x] 1   [] 2   [] 3   [] 4   6. Climbing 3-5 steps with a railing? [x] 1   [] 2   [] 3   [] 4   © 2007, Trustees of [facility] [employer], under license to [email or web address] All rights reserved      Score:  Initial: 10 Most Recent: X (Date: -- )    Interpretation of Tool:  Represents activities that are increasingly more difficult (i.e. Bed mobility, Transfers, Gait). Medical Necessity:     Patient is expected to demonstrate progress in   strength and functional technique   to   decrease assistance required with bed mobility and SPT  . Reason for Services/Other Comments:  Patient continues to require skilled intervention due to   medical complications  . Use of outcome tool(s) and clinical judgement create a POC that gives a: Questionable prediction of patient's progress: MODERATE COMPLEXITY            TREATMENT:   (In addition to Assessment/Re-Assessment sessions the following treatments were rendered)   Pre-treatment Symptoms/Complaints:  Patient had no specific complaints but did say he has a fear of falling  Pain: Initial: 0/10     Post Session:  0/10   PT Initial Assessment    Therapeutic Activity: ( ):  Therapeutic activities including Chair transfers and sit to stand and standing to improve mobility and strength. Required moderate verbal cues to promote static and dynamic balance in standing. Braces/Orthotics/Lines/Etc:   O2 Device: Room air  Treatment/Session Assessment:    Response to Treatment:  Patient participated and tolerated therapy well this afternoon. He has a fear of falling and needs reassurance about not falling   Interdisciplinary Collaboration:   Physical Therapist  Registered Nurse  After treatment position/precautions:   Up in chair  Bed/Chair-wheels locked  Call light within reach  RN notified  Family at bedside   Compliance with Program/Exercises:  Will assess as treatment progresses  Recommendations/Intent for next treatment session: \"Next visit will focus on advancements to more challenging activities and reduction in assistance provided\".   Total Treatment Duration:  PT Patient Time In/Time Out  Time In: 1255  Time Out: [address]

## 2020-11-19 NOTE — PROGRESS NOTES
Bedside shift change report given to Joselyn Cooley RN (oncoming nurse) by Kinjal Heart RN (offgoing nurse). Report included the following information SBAR, Kardex, Procedure Summary, Intake/Output, MAR, Recent Results and Cardiac Rhythm NSR, first degree AVB, PAC.

## 2020-11-19 NOTE — PROGRESS NOTES
Returned call to patient's daughter Jovanny Ribera and verified patient's 4 digit code. Jovanny Ribera states when she was here earlier today she felt like her father was drowsy from the sedation he received for yesterday's procedure. She further stated that in the past when patient received sedation it took a few days for patient to clear to baseline. Jovanny Ribera states patient is only oriented to self and family members at home and is cooperative with family members who provide his care daily. Completed admission questions per patient's daughter's responses. Jovanny Ribera states patient's baseline activity is max assist x2 to stand and transfer to w/c at home and is unable to ambulate. Patient is able to feed self with set up and requires assistance with bathing, toileting, and dressing. Jovanny Ribera states patient is on a toileting schedule at home and uses a bedside commode but has some incontinence at times and wears adult pull ups. Jovanny Ribera states she is assisted with her father's care by her 21 yr. Old son and her spouse. Update provided to Jovanny Ribera on patient's condition. Jovanny Ribera states she plans to take patient home tomorrow if he is able to transfer from bed to w/c with assist x2.

## 2020-11-19 NOTE — PROGRESS NOTES
Elver  NP notified of patient potassium level of 3.3 and 9 beat run of V tach noted on monitor at 0523 order received for potassium 40meq po x1.

## 2020-11-19 NOTE — PROGRESS NOTES
[facility] CARDIOLOGY PROGRESS NOTE           11/19/2020 8:18 AM    Admit Date: 11/17/2020         Subjective: Doing well, was very weak yesterday getting PT/OT eval today. ROS:  Cardiovascular:  As noted above    Objective:      Vitals:    11/18/20 2302 11/19/20 0328 11/19/20 0425 11/19/20 0714   BP: 115/66 130/79  132/73   Pulse: 83 92  96   Resp: 18 18 18   Temp: 98.8 °F (37.1 °C) 99.5 °F (37.5 °C)  98.2 °F (36.8 °C)   SpO2: 95% 94%  91%   Weight:   200 lb (90.7 kg)    Height:           Physical Exam:  General: Well Developed, Well Nourished, No Acute Distress, Alert & Oriented x 3, Appropriate mood  Neck: supple, no JVD  Heart: S1S2 with RRR without murmurs or gallops  Lungs: Clear throughout auscultation bilaterally without adventitious sounds  Abd: soft, nontender, nondistended, with good bowel sounds  Ext: no edema bilaterally  Skin: warm and dry      Data Review:   Recent Labs     11/19/20  0307 11/18/20  0311    139   K 3.3* 3.8   BUN 14 16   CREA 0.78* 0.93   GLU 84 92   WBC 10.0 10.8   HGB 10.3* 11.3*   HCT 31.6* 34.9*    189       No results for input(s): TNIPOC, TROIQ in the last 72 hours.     Assessment/Plan:     Principal Problem:    S/P TAVR (transcatheter aortic valve replacement) (11/17/2020)      Overview: 11/2020:  26mm [place] Ultra    Active Problems:    Coronary artery disease involving native coronary artery of native heart without angina pectoris (5/9/2016)      Overview: 2016:  2.25x28 Xience dLAD - moderate mLAD disease treated medically      2020:  Severe ostial LCx and small D1 and dLAD disease managed medically      Orthostatic hypotension (6/13/2016)      Pure hypercholesterolemia (6/27/2016)      Chronic combined systolic and diastolic congestive heart failure ([address] (9/11/2020)      Overview: 08/2020:  EF 40-50%      Syncope and collapse (11/13/2020)    A/P  1) TAVR - continue asa/plavix  2) HTN - continue meds  3) Weakness - PT/OT eval ? Rehab  4) Lipids - statin      Rosalia Sanchez MD  11/19/2020 8:18 AM

## 2020-11-19 NOTE — PROGRESS NOTES
1000 - Patient states \"I need to have a bowel movement\". The tech and primary nurse attempted to move the patient to the [facility] from the recliner. The patient could not assist with standing. The patient was sat back down in the recliner and a third person was called to help. The patient required max to total assist of 3. Patient leaned back and stepped forward. The patient has significant posterior trunk lean.

## 2020-11-19 NOTE — NURSE NAVIGATOR
Spoke with daughter and agrees for plans of home health and PT/OTwith potential discharge tomorrow. Daughter prefers [facility] over rehab  And states pt has been in rehab in the past and \"didn't do so well with that\". Daughter, her  and son have a routine at home with care for the pt that works well.     Ann Appiah

## 2020-11-19 NOTE — PROGRESS NOTES
Bedside and Verbal shift change report given to Winifred Sosa RN (oncoming nurse) by self Zen Monday nurse). Report included the following information SBAR, Kardex, Intake/Output, MAR, Recent Results and Cardiac Rhythm NSR.

## 2020-11-19 NOTE — PROGRESS NOTES
The patient was noted to have more weakness than when it was time to DC and was not able to pivot like normal to a wheelchair with a two person assist. Per family he is much weaker than normal and would be very difficult to manage at home. Will plan on holding DC tonight, PPD placed, have the patient up in the chair tonight before bed, and PT/OT to evaluate tomorrow. Dr Malick laughlin.     Ana Hamilton NP  11/19/20  7:31 AM

## 2020-11-19 NOTE — PROGRESS NOTES
Bedside shift change report given to [place] (oncoming nurse) by Winifred Sosa RN (offgoing nurse). Report included the following information SBAR, Kardex, Procedure Summary, Intake/Output, MAR, Recent Results and Cardiac Rhythm NSR, first degree AVB, PAC's.

## 2020-11-20 PROBLEM — I44.7 LBBB (LEFT BUNDLE BRANCH BLOCK): Status: ACTIVE | Noted: 2020-11-20

## 2020-11-20 PROBLEM — R55 SYNCOPE AND COLLAPSE: Status: RESOLVED | Noted: 2020-11-13 | Resolved: 2020-11-20

## 2020-11-20 LAB
ABO + RH BLD: NORMAL
ANION GAP SERPL CALC-SCNC: 7 MMOL/L (ref 7–16)
ATRIAL RATE: 75 BPM
BLD PROD TYP BPU: NORMAL
BLOOD GROUP ANTIBODIES SERPL: NORMAL
BPU ID: NORMAL
BUN SERPL-MCNC: 13 MG/DL (ref 8–23)
CALCIUM SERPL-MCNC: 8.4 MG/DL (ref 8.3–10.4)
CALCULATED P AXIS, ECG09: 0 DEGREES
CALCULATED R AXIS, ECG10: -23 DEGREES
CALCULATED T AXIS, ECG11: 118 DEGREES
CHLORIDE SERPL-SCNC: 107 MMOL/L (ref 98–107)
CO2 SERPL-SCNC: 25 MMOL/L (ref 21–32)
CREAT SERPL-MCNC: 0.79 MG/DL (ref 0.8–1.5)
CROSSMATCH RESULT,%XM: NORMAL
DIAGNOSIS, 93000: NORMAL
ERYTHROCYTE [DISTWIDTH] IN BLOOD BY AUTOMATED COUNT: 14.5 % (ref 11.9–14.6)
GLUCOSE SERPL-MCNC: 97 MG/DL (ref 65–100)
HCT VFR BLD AUTO: 31.5 % (ref 41.1–50.3)
HGB BLD-MCNC: 10.6 G/DL (ref 13.6–17.2)
MCH RBC QN AUTO: 30.8 PG (ref 26.1–32.9)
MCHC RBC AUTO-ENTMCNC: 33.7 G/DL (ref 31.4–35)
MCV RBC AUTO: 91.6 FL (ref 79.6–97.8)
MM INDURATION POC: 0 MM (ref 0–5)
NRBC # BLD: 0 K/UL (ref 0–0.2)
P-R INTERVAL, ECG05: 360 MS
PLATELET # BLD AUTO: 133 K/UL (ref 150–450)
PMV BLD AUTO: 9.6 FL (ref 9.4–12.3)
POTASSIUM SERPL-SCNC: 3.6 MMOL/L (ref 3.5–5.1)
PPD POC: NEGATIVE NEGATIVE
Q-T INTERVAL, ECG07: 426 MS
QRS DURATION, ECG06: 136 MS
QTC CALCULATION (BEZET), ECG08: 475 MS
RBC # BLD AUTO: 3.44 M/UL (ref 4.23–5.6)
SODIUM SERPL-SCNC: 139 MMOL/L (ref 136–145)
SPECIMEN EXP DATE BLD: NORMAL
STATUS OF UNIT,%ST: NORMAL
UNIT DIVISION, %UDIV: 0
VENTRICULAR RATE, ECG03: 75 BPM
WBC # BLD AUTO: 9.7 K/UL (ref 4.3–11.1)

## 2020-11-20 PROCEDURE — 80048 BASIC METABOLIC PNL TOTAL CA: CPT

## 2020-11-20 PROCEDURE — 2709999900 HC NON-CHARGEABLE SUPPLY

## 2020-11-20 PROCEDURE — 93005 ELECTROCARDIOGRAM TRACING: CPT | Performed by: INTERNAL MEDICINE

## 2020-11-20 PROCEDURE — 99222 1ST HOSP IP/OBS MODERATE 55: CPT | Performed by: INTERNAL MEDICINE

## 2020-11-20 PROCEDURE — 74011250637 HC RX REV CODE- 250/637: Performed by: INTERNAL MEDICINE

## 2020-11-20 PROCEDURE — 97530 THERAPEUTIC ACTIVITIES: CPT

## 2020-11-20 PROCEDURE — 85027 COMPLETE CBC AUTOMATED: CPT

## 2020-11-20 PROCEDURE — 99232 SBSQ HOSP IP/OBS MODERATE 35: CPT | Performed by: INTERNAL MEDICINE

## 2020-11-20 PROCEDURE — 65660000004 HC RM CVT STEPDOWN

## 2020-11-20 PROCEDURE — 74011250637 HC RX REV CODE- 250/637: Performed by: PHYSICIAN ASSISTANT

## 2020-11-20 PROCEDURE — 36415 COLL VENOUS BLD VENIPUNCTURE: CPT

## 2020-11-20 RX ADMIN — ROSUVASTATIN 20 MG: 20 TABLET, FILM COATED ORAL at 08:24

## 2020-11-20 RX ADMIN — CARVEDILOL 6.25 MG: 3.12 TABLET, FILM COATED ORAL at 17:37

## 2020-11-20 RX ADMIN — TAMSULOSIN HYDROCHLORIDE 0.4 MG: 0.4 CAPSULE ORAL at 08:25

## 2020-11-20 RX ADMIN — Medication 10 ML: at 21:05

## 2020-11-20 RX ADMIN — LISINOPRIL 5 MG: 5 TABLET ORAL at 08:25

## 2020-11-20 RX ADMIN — Medication 1 AMPULE: at 21:04

## 2020-11-20 RX ADMIN — CARVEDILOL 6.25 MG: 3.12 TABLET, FILM COATED ORAL at 08:25

## 2020-11-20 RX ADMIN — CLOPIDOGREL BISULFATE 75 MG: 75 TABLET ORAL at 08:25

## 2020-11-20 RX ADMIN — ASPIRIN 81 MG CHEWABLE TABLET 81 MG: 81 TABLET CHEWABLE at 08:24

## 2020-11-20 RX ADMIN — ACETAMINOPHEN 650 MG: 325 TABLET, FILM COATED ORAL at 14:32

## 2020-11-20 RX ADMIN — Medication 1 AMPULE: at 08:25

## 2020-11-20 NOTE — PROGRESS NOTES
Problem: Mobility Impaired (Adult and Pediatric)  Goal: *Acute Goals and Plan of Care (Insert Text)  Note: Goals:  (1.)Mr. Pedro Pablo Lanier will move from supine to sit and sit to supine , scoot up and down, and roll side to side with MINIMAL ASSIST within 3 treatment day(s). (2.)Mr. Pedro Pablo Lanier will transfer from bed to chair and chair to bed with MINIMAL ASSIST using the least restrictive device within 3 treatment day(s). (3.)Mr. Pedro Pablo Lanier will stand for 2-3 minutes with RW for UE support with upright standing posture within 3 treatment day(s). PHYSICAL THERAPY: Daily Note and AM 11/20/2020  INPATIENT:    [place] / [place] B / Product Type: Medicare /       NAME/AGE/GENDER: Terrell Aguayo is a 79 y.o. 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 Pedro Pablo Lanier is an 79year old WF s/p Aortic Valve Replacement secondary to cardiac valve disease. His PMH includes Aortic stenosis, Arthritis, CAD, Claustrophobia, GERD, Heart failure, Ill-defined condition, Sarcoidosis of lymph nodes, Seizures ([facility] [address] Shortness of breath, Syncope and collapse, and Vertigo. He presents today sitting up in the bedside chair. He has scooted down and needs to be repositioned. RN present to assist.  Sit to stand with moderate to maximum assist with lots of verbal cues for direction and participation.   Patient has a fear of falling noted by some of his responses. Patient stood several times with mod to max assist with the use of the walker. Patient has decreased upright posture but this does improve with cues but does not last long. Brief changed while the patient was standing. Patient is returned to sitting in the recliner with alarm intact and needs within reach. He is oriented to his name and he knows his daughter. He lives with her and her family in a 1 level home with 3 steps to enter. His PLOF has been primarily at a w/c level only standing and assisting with transitioning from the w/c to bed, toilet and chair. They have equipment at home for use as needed. The daughter reports he does not really ambulate at home. Mr. [place] would benefit from continued skilled PT to maximize his functional abilities while in the hospital. Home with HHPT at discharge. Will continue PT efforts. This section established at most recent assessment   PROBLEM LIST (Impairments causing functional limitations):  1. Decreased Strength  2. Decreased ADL/Functional Activities  3. Decreased Transfer Abilities  4. Decreased Ambulation Ability/Technique  5. Decreased Activity Tolerance  6. Decreased Cognition   INTERVENTIONS PLANNED: (Benefits and precautions of physical therapy have been discussed with the patient.)  1. Bed Mobility  2. Gait Training  3. Therapeutic Activites  4. Therapeutic Exercise/Strengthening  5. Transfer Training     TREATMENT PLAN: Frequency/Duration: 3 times a week for duration of hospital stay  Rehabilitation Potential For Stated Goals: [facility] (at time of discharge pending progress):    Placement: It is my opinion, based on this patient's performance to date, that Mr.  [place] may benefit from [address] after discharge due to the functional deficits listed above that are likely to improve with skilled rehabilitation because he/she has multiple medical issues that affect his/her functional mobility in the community. Equipment:    None at this time              HISTORY:   History of Present Injury/Illness (Reason for Referral):  Patient is a 79 y.o. male presents with low flow/low gradient AS. EF 40-45%. He has diffuse pattern CAD and critical LCx disease. He was referred for CABG/AVR but due to severe debility he was determined he would be best severed with TAVR. CAD reviewed and felt ostial LCx disease was too high risk for PCI and he is being managed medically. He was admitted last week with syncope thought to be due to orthostasis and vagal spell. He has recovered and now here for TAVR. Past Medical History/Comorbidities:   Mr. Le Nash  has a past medical history of Aortic stenosis, Arthritis, CAD (coronary artery disease), Claustrophobia, GERD (gastroesophageal reflux disease), Heart failure ([address] Ill-defined condition, Sarcoidosis of lymph nodes, Seizures ([address] Shortness of breath, Syncope and collapse, and Vertigo. Mr. Le Nash  has a past surgical history that includes hx lymph node dissection; hx cataract removal (Bilateral); hx colonoscopy; pr cardiac surg procedure unlist; hx lap cholecystectomy; hx urological (2015); hx shoulder arthroscopy; and hx knee arthroscopy.   Social History/Living Environment:   Home Environment: Private residence  # Steps to Enter: 3  One/Two Story Residence: One story  Living Alone: No  Support Systems: Child(nona)  Patient Expects to be Discharged to[de-identified] Private residence  Current DME Used/Available at [facility]: Wheelchair, Sahil Goyo, [place], [place], straight, Com[employer], bedside  Prior Level of Function/Work/Activity:  Per family member, patient does not ambulate at home but does assist with transitioning from bed to/from W/C and/or BSC to/from W/C     Number of Personal Factors/Comorbidities that affect the Plan of Care: 3+: HIGH COMPLEXITY   EXAMINATION:   Most Recent Physical Functioning:   Gross Assessment:                  Posture: Balance:  Sitting: Impaired  Sitting - Static: Fair (occasional)  Sitting - Dynamic: Fair (occasional)  Standing: Impaired  Standing - Static: Constant support;Poor  Standing - Dynamic : Poor Bed Mobility:     Wheelchair Mobility:     Transfers:  Sit to Stand: Moderate assistance;Maximum assistance;Assist x2  Stand to Sit: Moderate assistance;Assist x2  Gait:            Body Structures Involved:  1. Heart  2. Thoracic Cage Body Functions Affected:  1. Cardio  2. Movement Related Activities and Participation Affected:  1. General Tasks and Demands  2. Mobility  3. Self Care   Number of elements that affect the Plan of Care: 3: MODERATE COMPLEXITY   CLINICAL PRESENTATION:   Presentation: Evolving clinical presentation with changing clinical characteristics: MODERATE COMPLEXITY   CLINICAL DECISION MAKIN [facility] Inpatient Short Form  How much difficulty does the patient currently have. .. Unable A Lot A Little None   1. Turning over in bed (including adjusting bedclothes, sheets and blankets)? [] 1   [] 2   [x] 3   [] 4   2. Sitting down on and standing up from a chair with arms ( e.g., wheelchair, bedside commode, etc.)   [] 1   [x] 2   [] 3   [] 4   3. Moving from lying on back to sitting on the side of the bed? [] 1   [x] 2   [] 3   [] 4   How much help from another person does the patient currently need. .. Total A Lot A Little None   4. Moving to and from a bed to a chair (including a wheelchair)? [x] 1   [] 2   [] 3   [] 4   5. Need to walk in hospital room? [x] 1   [] 2   [] 3   [] 4   6. Climbing 3-5 steps with a railing? [x] 1   [] 2   [] 3   [] 4   © 2007, Trustees of [address] [place] [address] [address] [address], under license to [employer]. All rights reserved      Score:  Initial: 10 Most Recent: X (Date: -- )    Interpretation of Tool:  Represents activities that are increasingly more difficult (i.e. Bed mobility, Transfers, Gait).     Medical Necessity:     · Patient is expected to demonstrate progress in   · strength and functional technique  ·  to   · decrease assistance required with bed mobility and SPT  · .  Reason for Services/Other Comments:  · Patient continues to require skilled intervention due to   · medical complications  · . Use of outcome tool(s) and clinical judgement create a POC that gives a: Questionable prediction of patient's progress: MODERATE COMPLEXITY            TREATMENT:   (In addition to Assessment/Re-Assessment sessions the following treatments were rendered)   Pre-treatment Symptoms/Complaints:  Patient confused and talking about random topics  Pain: Initial: 0/10  Pain Intensity 1: 0  Post Session:  0/10   PT Initial Assessment    Therapeutic Activity: ( 13 minutes): Therapeutic activities including  sit to stand and standing and standing balance activities  to improve mobility and strength. Required moderate to max assist and max erbal cues to promote static and dynamic balance in standing. Braces/Orthotics/Lines/Etc:   · O2 Device: Room air  Treatment/Session Assessment:    · Response to Treatment:  Patient participated and tolerated therapy well. Needs a lot of coaxing. He has a fear of falling and needs reassurance about not falling   · Interdisciplinary Collaboration:   o Physical Therapy Assistant  o Registered Nurse  · After treatment position/precautions:   o Up in chair  o Bed alarm/tab alert on  o Bed/Chair-wheels locked  o Call light within reach  o RN notified   · Compliance with Program/Exercises: Will assess as treatment progresses  · Recommendations/Intent for next treatment session: \"Next visit will focus on advancements to more challenging activities and reduction in assistance provided\".   Total Treatment Duration:  PT Patient Time In/Time Out  Time In: 1030  Time Out: 1043    Nadja Armstrong PTA

## 2020-11-20 NOTE — PROGRESS NOTES
[facility] CARDIOLOGY PROGRESS NOTE           11/20/2020 12:56 PM    Admit Date: 11/17/2020      Subjective:   Patient denies any dyspnea or chest pain. Has LBBB with prolonged first degree AV block and appears had 2:1 conduction yesterday afternoon. Also runs of SVT. ROS:  Cardiovascular:  As noted above    Objective:      Vitals:    11/20/20 0400 11/20/20 0522 11/20/20 0706 11/20/20 1108   BP: 113/72  123/76 115/64   Pulse: 83  80 72   Resp: 18  18 18   Temp: 99.2 °F (37.3 °C)  99.8 °F (37.7 °C) 98.9 °F (37.2 °C)   SpO2: 97%  96% 95%   Weight:  201 lb (91.2 kg)     Height:           Physical Exam:  General-No Acute Distress  Neck- supple, no JVD  CV- regular rate and rhythm Grade I/VI AMANUEL  Lung- clear bilaterally  Abd- soft, nontender, nondistended  Ext- no edema bilaterally. Skin- warm and dry      Data Review:   Recent Labs     11/20/20  0312 11/19/20  0307    140   K 3.6 3.3*   BUN 13 14   CREA 0.79* 0.78*   GLU 97 84   WBC 9.7 10.0   HGB 10.6* 10.3*   HCT 31.5* 31.6*   * 151      No results found for: FLORENCE Levin    Assessment/Plan:     Principal Problem:    S/P TAVR (transcatheter aortic valve replacement) (11/17/2020)    Stable. Valve with normal function. ON ASA and Plavix. Active Problems:    Coronary artery disease involving native coronary artery of native heart without angina pectoris (5/9/2016)    STable. Medical therapy. No angina. Pure hypercholesterolemia (6/27/2016)    On Crestor. Chronic combined systolic and diastolic congestive heart failure ([address] (9/11/2020)    Mild LV dysfunction. LBBB (left bundle branch block) (11/20/2020)    EP consult with new conduction system disease and SVT. May need Bi-V pacemaker.                    Janice Manjarrez MD  11/20/2020 12:56 PM

## 2020-11-20 NOTE — PROGRESS NOTES
Bedside shift change report given to [address] (oncoming nurse) by Pop Bui RN (offgoing nurse). Report included the following information SBAR, Kardex, Procedure Summary, Intake/Output, MAR, Recent Results and Cardiac Rhythm NSR, first degree AVB, BBB.

## 2020-11-20 NOTE — PROGRESS NOTES
Patient awakened he has baseline confusion he is stiff and not very coopertive with turning for incontinence care. Pericare provided patient was incontinent of large amount of urine and small amount of loose yellowish/light brown stool. Patient was transferred with max assist x2 to recliner. Will continue to monitor.

## 2020-11-20 NOTE — PROGRESS NOTES
Assessment complete, gauze and opsite dressing applied after procedure removed from right groin puncture site moderate bruising noted no sign of hematoma band aid applied.

## 2020-11-20 NOTE — PROGRESS NOTES
Bedside report given to Josue Hinton [facility]. Opportunity for questions, concerns. Patient involved.

## 2020-11-20 NOTE — PROGRESS NOTES
Verbal bedside report given to oncoming RN, Eitan Gillespie. Patient's situation, background, assessment and recommendations provided. Opportunity for questions provided. Oncoming RN assumed care of patient.

## 2020-11-20 NOTE — PROGRESS NOTES
Bedside shift change report given to Ina Jama and student nurse Matteo (oncoming nurse) by Henny Lowery (offgoing nurse). Report included the following information SBAR, Kardex, Procedure Summary, Intake/Output, MAR, Recent Results and Cardiac Rhythm NSR with first degree AVB.

## 2020-11-20 NOTE — CONSULTS
[facility] Cardiology Consult                Date of  Admission: 11/17/2020  6:40 AM     Primary Care Physician: Elisabeth Fabry, MD  Primary Cardiologist: Dr Kadeem Harper  Referring Physician: Dr Bethany Murphy Physician: Dr Sammi Pelletier       Assessment/Plan:   79year old male with LFLG severe AS s/p TAVR c/b profound FDAVB and LBBB. He did have a run of 2:1 AV block yesterday. He is rather weak. He has had recent syncope as well. His conduction system disease is severe. There is a chance his FDAVB may improve over the weekend. Another issue is that he will need to continue his coreg for heart failure and CAD. I suspect his heart block will continue to be an issue and he would derive benefit from a pacemaker. Given his dyssynchronous heart rhythm and likelihood of high degree of ventricular pacing, he does have a biventricular lead indication. I will observe him over the weekend and tentatively plan for a device implant early next week. S/P TAVR (transcatheter aortic valve replacement)-11/2020:  26mm Palacios Sharita Ultra    Coronary artery disease involving native coronary artery of native heart without angina pectoris (5/9/2016)      Overview: 2016:  2.25x28 Xience dLAD - moderate mLAD disease treated medically      2020:  Severe ostial LCx and small D1 and dLAD disease managed medically    Orthostatic hypotension (6/13/2016)    Pure hypercholesterolemia (6/27/2016)    Chronic combined systolic and diastolic congestive heart failure ([address]- EF 40-50%, cont ACE, BB    LBBB (left bundle branch block) (11/20/2020)    CC/Reason for consult: SSS    =======================================================================================  Sahil Awilda is a 79 y.o. male admitted for severe aortic stenosis s/p TAVR. He has a h/o low flow/low gradient AS. EF 40-45%. He has diffuse pattern CAD and critical LCx disease.   He was referred for CABG/AVR but due to severe debility he was determined he would be best severed with TAVR. CAD reviewed and felt ostial LCx disease was too high risk for PCI and he is being managed medically. He was admitted with syncope a week ago and d/c after he was not found to have any arrhythmia but thought to be due to his orthostatic hypotension. He was admitted on 11-17 and underwent TAVR w 32 m Palacios Sharita valve. Post procedure pt had a new LBBB and long first degree block and temp pacer was left in place. He then developed an SVT w rate around 159. Temp pacer was removed. He has been seen by PT/OT for his debility. EP has been consulted for concern for SSS. Remains on Coreg. Patient Active Problem List   Diagnosis Code    Seizure disorder ([facility] [address] G40.909    Coronary artery disease involving native coronary artery of native heart without angina pectoris I25.10    Orthostatic hypotension I95.1    Abnormal cardiovascular stress test R94.39    Pure hypercholesterolemia E78.00    Environmental allergies Z91.09    Chronic low back pain M54.5, G89.29    S/P angioplasty with stent Z95.820    Gastroparesis K31.84    Nonrheumatic aortic valve stenosis I35.0    Chronic combined systolic and diastolic congestive heart failure (HCC) I50.42    Dysuria R30.0    S/P TAVR (transcatheter aortic valve replacement) Z95.2    LBBB (left bundle branch block) I44.7       Past Medical History:   Diagnosis Date    Aortic stenosis     Arthritis     generalized osteo.     CAD (coronary artery disease)     6/16/2016-had CP- stent X 1- no MI     Claustrophobia     GERD (gastroesophageal reflux disease)     probable symptoms daughter reports that lives with pt    Heart failure ([facility] [address]     Echo 08/18/20 EF 45-50%    Ill-defined condition     orthostatic hypotension    Sarcoidosis of lymph nodes     in remission    Seizures (HCC)     Dr Steven Speak (cardio) said might have had TIA- questionable if he had a seizure    Shortness of breath     Syncope and collapse     Vertigo     occassional Past Surgical History:   Procedure Laterality Date    CARDIAC SURG PROCEDURE UNLIST      1 stent 6/16/16 in LAD    HX CATARACT REMOVAL Bilateral     HX COLONOSCOPY      HX KNEE ARTHROSCOPY      left knee    HX LAP CHOLECYSTECTOMY      HX LYMPH NODE DISSECTION      Sarcoid in remission    HX SHOULDER ARTHROSCOPY      right side     HX UROLOGICAL  2015    TURP     Allergies   Allergen Reactions    Bactrim [Sulfamethoprim Ds] Other (comments)     Oliguria    Keppra [Levetiracetam] Other (comments)     Made legs weak    Levaquin [Levofloxacin] Diarrhea    Other Medication Other (comments)     \"Probanthene and Dartal\"? States stomach medication. Numbness and shakiness.  \"Made him feel he was having a heart attack\"  \"Some medication he got in the \"      Family History   Problem Relation Age of Onset    Heart Disease Mother 68        MI    Diabetes Mother         borderline     Hypertension Mother     Cancer Father     Stroke Brother     Coronary Artery Disease Other         Family History      Social History     Tobacco Use    Smoking status: Former Smoker    Smokeless tobacco: Former User    Tobacco comment: quit over 20 years ago   Substance Use Topics    Alcohol use: No        Current Facility-Administered Medications   Medication Dose Route Frequency    rosuvastatin (CRESTOR) tablet 20 mg  20 mg Oral DAILY    carvediloL (COREG) tablet 6.25 mg  6.25 mg Oral BID WITH MEALS    clopidogreL (PLAVIX) tablet 75 mg  75 mg Oral DAILY    lisinopriL (PRINIVIL, ZESTRIL) tablet 5 mg  5 mg Oral DAILY    tamsulosin (FLOMAX) capsule 0.4 mg  0.4 mg Oral DAILY    ondansetron (ZOFRAN) injection 4 mg  4 mg IntraVENous Q4H PRN    nitroglycerin (NITROSTAT) tablet 0.4 mg  0.4 mg SubLINGual Q5MIN PRN    alcohol 62% (NOZIN) nasal  1 Ampule  1 Ampule Topical Q12H    sodium chloride (NS) flush 5-40 mL  5-40 mL IntraVENous Q8H    sodium chloride (NS) flush 5-40 mL  5-40 mL IntraVENous PRN    acetaminophen (TYLENOL) tablet 650 mg  650 mg Oral Q4H PRN    morphine injection 2 mg  2 mg IntraVENous Q4H PRN    nitroglycerin (NITROSTAT) tablet 0.4 mg  0.4 mg SubLINGual Q5MIN PRN    aspirin chewable tablet 81 mg  81 mg Oral DAILY    LORazepam (ATIVAN) tablet 1 mg  1 mg Oral Q6H PRN    HYDROcodone-acetaminophen (NORCO) 5-325 mg per tablet 1 Tab  1 Tab Oral Q4H PRN       Review of Symptoms:  General: no weight change,  + weakness, no fever or chills  Skin: no rashes, lumps, or other skin changes  HEENT: no headache, dizziness, lightheadedness, vision changes, hearing changes, tinnitus, vertigo, sinus pressure/pain, bleeding gums, sore throat, or hoarseness  Neck: no swollen glands, goiter, pain or stiffness  Respiratory: no cough, sputum, hemoptysis, no dyspnea, wheezing  Cardiovascular: + as per HPI  Gastrointestinal: no GERD, constipation, diarrhea, liver problems, or h/o GI bleed  Urinary: no frequency, urgency , hematuria, burning/pain with urination, recent flank pain, polyuria, nocturia, or difficulty urinating  Peripheral Vascular: no claudication, leg cramps, prior DVTs, swelling of calves, legs, or feet, color change, or swelling with redness or tenderness  Musculoskeletal: + debility  Psychiatric: no depression or excessive stress  Neurological: + confusion   Hematologic: no anemia, easy bruising or bleeding  Endocrine: no thyroid problems, heat or cold intolerance, excessive sweating, polyuria, polydipsia, no diabetes.        Physical Exam  Vitals:    11/19/20 2343 11/20/20 0400 11/20/20 0522 11/20/20 0706   BP:  113/72  123/76   Pulse:  83  80   Resp:  18  18   Temp: (!) 64.4 °F (18 °C) 99.2 °F (37.3 °C)  99.8 °F (37.7 °C)   SpO2:  97%  96%   Weight:   91.2 kg (201 lb)    Height:           Physical Exam:  General: Well Developed, Well Nourished, No Acute Distress  HEENT: pupils equal and round, no abnormalities noted  Neck: supple, no JVD, no carotid bruits  Heart: S1S2 with RRR without murmurs or gallops  Lungs: Clear throughout auscultation bilaterally without adventitious sounds  Abd: soft, nontender, nondistended, with good bowel sounds  Ext: warm, no edema, calves supple/nontender, pulses 2+ bilaterally  Skin: warm and dry  Psychiatric: Normal mood and affect  Neurologic: Alert and oriented X 3      Cardiographics    Telemetry: NSR w L BBB and long first degree block       Labs:   Recent Labs     11/20/20  0312 11/19/20  0307    140   K 3.6 3.3*   BUN 13 14   CREA 0.79* 0.78*   GLU 97 84   WBC 9.7 10.0   HGB 10.6* 10.3*   HCT 31.5* 31.6*   * 151       Thank you very much for this referral. We appreciate the opportunity to participate in this patient's care. We will follow along with above stated plan. Joanie Lynn PA-C  Consulting MD: Jayce Avelar.  Wendi Elizalde MD

## 2020-11-20 NOTE — PROGRESS NOTES
Returned call to greg Becerra Pock 4 digit code confirmed update and emotional support provided encouraged to call at anytime.

## 2020-11-20 NOTE — PROGRESS NOTES
Problem: Falls - Risk of  Goal: *Absence of Falls  Description: Document Darryl Click Fall Risk and appropriate interventions in the flowsheet. Outcome: Progressing Towards Goal  Note: Fall Risk Interventions:  Mobility Interventions: Bed/chair exit alarm, Communicate number of staff needed for ambulation/transfer, Patient to call before getting OOB    Mentation Interventions: Bed/chair exit alarm, Door open when patient unattended    Medication Interventions: Assess postural VS orthostatic hypotension, Bed/chair exit alarm, Patient to call before getting OOB    Elimination Interventions: Call light in reach    History of Falls Interventions: Bed/chair exit alarm         Problem: Patient Education: Go to Patient Education Activity  Goal: Patient/Family Education  Outcome: Progressing Towards Goal     Problem: Pressure Injury - Risk of  Goal: *Prevention of pressure injury  Description: Document Timmy Scale and appropriate interventions in the flowsheet.   Outcome: Progressing Towards Goal  Note: Pressure Injury Interventions:  Sensory Interventions: Assess changes in LOC    Moisture Interventions: Absorbent underpads, Apply protective barrier, creams and emollients    Activity Interventions: Chair cushion, Increase time out of bed, PT/OT evaluation    Mobility Interventions: Chair cushion, HOB 30 degrees or less, PT/OT evaluation    Nutrition Interventions: Document food/fluid/supplement intake                     Problem: Patient Education: Go to Patient Education Activity  Goal: Patient/Family Education  Outcome: Progressing Towards Goal

## 2020-11-20 NOTE — PROGRESS NOTES
Bedside and Verbal shift change report given to self (oncoming nurse) by Glenny Morales (offgoing nurse). Report included the following information SBAR, Kardex and MAR.

## 2020-11-21 LAB
ANION GAP SERPL CALC-SCNC: 7 MMOL/L (ref 7–16)
ATRIAL RATE: 73 BPM
BUN SERPL-MCNC: 12 MG/DL (ref 8–23)
CALCIUM SERPL-MCNC: 8.7 MG/DL (ref 8.3–10.4)
CALCULATED P AXIS, ECG09: 15 DEGREES
CALCULATED R AXIS, ECG10: -28 DEGREES
CALCULATED T AXIS, ECG11: 129 DEGREES
CHLORIDE SERPL-SCNC: 107 MMOL/L (ref 98–107)
CO2 SERPL-SCNC: 27 MMOL/L (ref 21–32)
CREAT SERPL-MCNC: 0.81 MG/DL (ref 0.8–1.5)
DIAGNOSIS, 93000: NORMAL
ERYTHROCYTE [DISTWIDTH] IN BLOOD BY AUTOMATED COUNT: 14.5 % (ref 11.9–14.6)
GLUCOSE SERPL-MCNC: 98 MG/DL (ref 65–100)
HCT VFR BLD AUTO: 34.5 % (ref 41.1–50.3)
HGB BLD-MCNC: 11.3 G/DL (ref 13.6–17.2)
MCH RBC QN AUTO: 30.1 PG (ref 26.1–32.9)
MCHC RBC AUTO-ENTMCNC: 32.8 G/DL (ref 31.4–35)
MCV RBC AUTO: 91.8 FL (ref 79.6–97.8)
MM INDURATION POC: 0 MM (ref 0–5)
NRBC # BLD: 0 K/UL (ref 0–0.2)
P-R INTERVAL, ECG05: 380 MS
PLATELET # BLD AUTO: 167 K/UL (ref 150–450)
PMV BLD AUTO: 9.7 FL (ref 9.4–12.3)
POTASSIUM SERPL-SCNC: 3.6 MMOL/L (ref 3.5–5.1)
PPD POC: NEGATIVE NEGATIVE
Q-T INTERVAL, ECG07: 418 MS
QRS DURATION, ECG06: 132 MS
QTC CALCULATION (BEZET), ECG08: 460 MS
RBC # BLD AUTO: 3.76 M/UL (ref 4.23–5.6)
SODIUM SERPL-SCNC: 141 MMOL/L (ref 136–145)
VENTRICULAR RATE, ECG03: 73 BPM
WBC # BLD AUTO: 9.2 K/UL (ref 4.3–11.1)

## 2020-11-21 PROCEDURE — 80048 BASIC METABOLIC PNL TOTAL CA: CPT

## 2020-11-21 PROCEDURE — 93005 ELECTROCARDIOGRAM TRACING: CPT | Performed by: INTERNAL MEDICINE

## 2020-11-21 PROCEDURE — 65660000004 HC RM CVT STEPDOWN

## 2020-11-21 PROCEDURE — 36415 COLL VENOUS BLD VENIPUNCTURE: CPT

## 2020-11-21 PROCEDURE — 74011250637 HC RX REV CODE- 250/637: Performed by: INTERNAL MEDICINE

## 2020-11-21 PROCEDURE — 74011250637 HC RX REV CODE- 250/637: Performed by: PHYSICIAN ASSISTANT

## 2020-11-21 PROCEDURE — 99024 POSTOP FOLLOW-UP VISIT: CPT | Performed by: INTERNAL MEDICINE

## 2020-11-21 PROCEDURE — 85027 COMPLETE CBC AUTOMATED: CPT

## 2020-11-21 PROCEDURE — 99232 SBSQ HOSP IP/OBS MODERATE 35: CPT | Performed by: INTERNAL MEDICINE

## 2020-11-21 RX ADMIN — CARVEDILOL 6.25 MG: 3.12 TABLET, FILM COATED ORAL at 16:50

## 2020-11-21 RX ADMIN — Medication 10 ML: at 22:21

## 2020-11-21 RX ADMIN — Medication 1 AMPULE: at 10:00

## 2020-11-21 RX ADMIN — ROSUVASTATIN 20 MG: 20 TABLET, FILM COATED ORAL at 10:00

## 2020-11-21 RX ADMIN — TAMSULOSIN HYDROCHLORIDE 0.4 MG: 0.4 CAPSULE ORAL at 10:00

## 2020-11-21 RX ADMIN — CARVEDILOL 6.25 MG: 3.12 TABLET, FILM COATED ORAL at 10:00

## 2020-11-21 RX ADMIN — Medication 10 ML: at 14:00

## 2020-11-21 RX ADMIN — LISINOPRIL 5 MG: 5 TABLET ORAL at 10:00

## 2020-11-21 RX ADMIN — CLOPIDOGREL BISULFATE 75 MG: 75 TABLET ORAL at 10:00

## 2020-11-21 RX ADMIN — ASPIRIN 81 MG CHEWABLE TABLET 81 MG: 81 TABLET CHEWABLE at 10:00

## 2020-11-21 RX ADMIN — Medication 1 AMPULE: at 22:20

## 2020-11-21 NOTE — PROGRESS NOTES
Bedside report received from Vianney Cordova RN. Opportunity for questions, concerns. Patient involved.

## 2020-11-21 NOTE — PROGRESS NOTES
[facility] CARDIOLOGY PROGRESS NOTE           11/21/2020 12:56 PM    Admit Date: 11/17/2020      Subjective:   Patient states he feels \"badly\". No specific complaints. Denies chest pain or dyspnea. Seen by EP. Will need a bi-V pacemaker on Monday. ROS:  Cardiovascular:  As noted above    Objective:      Vitals:    11/20/20 2334 11/21/20 0302 11/21/20 0721 11/21/20 0724   BP: 139/80 137/73  111/78   Pulse: 80 79  83   Resp: 18 20 20   Temp: 97.9 °F (36.6 °C) 98 °F (36.7 °C)  98.9 °F (37.2 °C)   SpO2: 94% 91%  93%   Weight:   182 lb 12.8 oz (82.9 kg)    Height:           Physical Exam:  General-No Acute Distress  Neck- supple, no JVD  CV- regular rate and rhythm Grade I/VI AMANUEL  Lung- clear bilaterally  Abd- soft, nontender, nondistended  Ext- no edema bilaterally. 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 Valve with normal function. ON ASA and Plavix. Active Problems:    Coronary artery disease involving native coronary artery of native heart without angina pectoris (5/9/2016)    STable. Medical therapy. No angina. Pure hypercholesterolemia (6/27/2016)    On Crestor. Chronic combined systolic and diastolic congestive heart failure ([address] (9/11/2020)    Mild LV dysfunction. LBBB (left bundle branch block) (11/20/2020)    EP consult appreciated. Will likely pacemaker on Monday.                    Natalie Mason MD  11/21/2020 12:56 PM

## 2020-11-22 LAB
ANION GAP SERPL CALC-SCNC: 8 MMOL/L (ref 7–16)
ATRIAL RATE: 77 BPM
BUN SERPL-MCNC: 13 MG/DL (ref 8–23)
CALCIUM SERPL-MCNC: 8.5 MG/DL (ref 8.3–10.4)
CALCULATED P AXIS, ECG09: -2 DEGREES
CALCULATED R AXIS, ECG10: -4 DEGREES
CALCULATED T AXIS, ECG11: 154 DEGREES
CHLORIDE SERPL-SCNC: 106 MMOL/L (ref 98–107)
CO2 SERPL-SCNC: 25 MMOL/L (ref 21–32)
CREAT SERPL-MCNC: 0.78 MG/DL (ref 0.8–1.5)
DIAGNOSIS, 93000: NORMAL
ERYTHROCYTE [DISTWIDTH] IN BLOOD BY AUTOMATED COUNT: 14.3 % (ref 11.9–14.6)
GLUCOSE SERPL-MCNC: 97 MG/DL (ref 65–100)
HCT VFR BLD AUTO: 33.2 % (ref 41.1–50.3)
HGB BLD-MCNC: 10.8 G/DL (ref 13.6–17.2)
MCH RBC QN AUTO: 30.2 PG (ref 26.1–32.9)
MCHC RBC AUTO-ENTMCNC: 32.5 G/DL (ref 31.4–35)
MCV RBC AUTO: 92.7 FL (ref 79.6–97.8)
NRBC # BLD: 0 K/UL (ref 0–0.2)
P-R INTERVAL, ECG05: 352 MS
PLATELET # BLD AUTO: 168 K/UL (ref 150–450)
PMV BLD AUTO: 9.6 FL (ref 9.4–12.3)
POTASSIUM SERPL-SCNC: 3.5 MMOL/L (ref 3.5–5.1)
Q-T INTERVAL, ECG07: 412 MS
QRS DURATION, ECG06: 136 MS
QTC CALCULATION (BEZET), ECG08: 466 MS
RBC # BLD AUTO: 3.58 M/UL (ref 4.23–5.6)
SODIUM SERPL-SCNC: 139 MMOL/L (ref 136–145)
VENTRICULAR RATE, ECG03: 77 BPM
WBC # BLD AUTO: 8.1 K/UL (ref 4.3–11.1)

## 2020-11-22 PROCEDURE — 85027 COMPLETE CBC AUTOMATED: CPT

## 2020-11-22 PROCEDURE — 99024 POSTOP FOLLOW-UP VISIT: CPT | Performed by: INTERNAL MEDICINE

## 2020-11-22 PROCEDURE — 74011250637 HC RX REV CODE- 250/637: Performed by: INTERNAL MEDICINE

## 2020-11-22 PROCEDURE — 36415 COLL VENOUS BLD VENIPUNCTURE: CPT

## 2020-11-22 PROCEDURE — 80048 BASIC METABOLIC PNL TOTAL CA: CPT

## 2020-11-22 PROCEDURE — 65660000004 HC RM CVT STEPDOWN

## 2020-11-22 PROCEDURE — 74011250637 HC RX REV CODE- 250/637: Performed by: PHYSICIAN ASSISTANT

## 2020-11-22 PROCEDURE — 2709999900 HC NON-CHARGEABLE SUPPLY

## 2020-11-22 PROCEDURE — 77030040393 HC DRSG OPTIFOAM GENT MDII -B

## 2020-11-22 PROCEDURE — 93005 ELECTROCARDIOGRAM TRACING: CPT | Performed by: INTERNAL MEDICINE

## 2020-11-22 RX ADMIN — CARVEDILOL 6.25 MG: 3.12 TABLET, FILM COATED ORAL at 17:21

## 2020-11-22 RX ADMIN — ACETAMINOPHEN 650 MG: 325 TABLET, FILM COATED ORAL at 09:09

## 2020-11-22 RX ADMIN — ROSUVASTATIN 20 MG: 20 TABLET, FILM COATED ORAL at 09:07

## 2020-11-22 RX ADMIN — Medication 10 ML: at 20:29

## 2020-11-22 RX ADMIN — CLOPIDOGREL BISULFATE 75 MG: 75 TABLET ORAL at 09:07

## 2020-11-22 RX ADMIN — ASPIRIN 81 MG CHEWABLE TABLET 81 MG: 81 TABLET CHEWABLE at 09:07

## 2020-11-22 RX ADMIN — Medication 10 ML: at 15:15

## 2020-11-22 RX ADMIN — Medication 1 AMPULE: at 20:29

## 2020-11-22 RX ADMIN — Medication 1 AMPULE: at 09:07

## 2020-11-22 RX ADMIN — Medication 10 ML: at 06:06

## 2020-11-22 RX ADMIN — TAMSULOSIN HYDROCHLORIDE 0.4 MG: 0.4 CAPSULE ORAL at 09:07

## 2020-11-22 RX ADMIN — LISINOPRIL 5 MG: 5 TABLET ORAL at 09:07

## 2020-11-22 RX ADMIN — CARVEDILOL 6.25 MG: 3.12 TABLET, FILM COATED ORAL at 09:07

## 2020-11-22 NOTE — PROGRESS NOTES
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)      Overview: 11/2020:  26mm Palacios Sharita Ultra    Active Problems:    Coronary artery disease involving native coronary artery of native heart without angina pectoris (5/9/2016)      Overview: 2016:  2.25x28 Xience dLAD - moderate mLAD disease treated medically      2020:  Severe ostial LCx and small D1 and dLAD disease managed medically      Orthostatic hypotension (6/13/2016)      Pure hypercholesterolemia (6/27/2016)      Chronic combined systolic and diastolic congestive heart failure ([address] (9/11/2020)      Overview: 08/2020:  EF 40-50%      LBBB (left bundle branch block) (11/20/2020)        Plan:   1. Profound conduction disease with 2:1 AV block, high degree AV block s/p TAVR, LBBB - will tentatively plan for CRT-P tomorrow. NPO after MN. Hibiclens wash and avoidance of heparin products. 2. Mild chronic systolic heart failure - EF 45-50%, continue GDMT. 3. CAD - complex PCI in 2016 and medically managed from 2020 [facility]. Continue P2Y12i, statin, OMT. 4. Severe AS s/p TAVR - stable function. Thank you for allowing me to participate in the electrophysiologic care of this most pleasant patient. Please feel free to contact me if there are any questions or concerns. Jenni Truong. Elray Holter, MD, MS  Clinical Cardiac Electrophysiology  [facility] Cardiology    Subjective:   No complaints this AM, no chest pain or shortness of breath. Mild baseline confusion/dementia.      Interval History: (History of pertinent interval events obtained from nursing staff)    ROS:  GEN:  No fever or chills  Cardiovascular:  As noted above  Pulmonary:  As noted above  Neuro:  No new focal motor or sensory loss      Objective:     Vitals:    11/21/20 2224 11/22/20 0319 11/22/20 0517 11/22/20 0714   BP: (!) 146/84 127/69  117/70   Pulse: 85 87  85   Resp: 18 18 20   Temp: 98.2 °F (36.8 °C) 98.6 °F (37 °C)  98.9 °F (37.2 °C)   SpO2: 94% 95%  95%   Weight:   202 lb 6.4 oz (91.8 kg)    Height:           Physical Exam:  General-Well Developed, Well Nourished, No Acute Distress, Alert & Oriented x 3, appropriate mood. Neck- supple, no JVD  CV- regular rate and rhythm no MRG  Lung- clear bilaterally  Abd- soft, nontender, nondistended  Ext- no edema bilaterally.   Skin- warm and dry    Current Facility-Administered Medications   Medication Dose Route Frequency    rosuvastatin (CRESTOR) tablet 20 mg  20 mg Oral DAILY    carvediloL (COREG) tablet 6.25 mg  6.25 mg Oral BID WITH MEALS    clopidogreL (PLAVIX) tablet 75 mg  75 mg Oral DAILY    lisinopriL (PRINIVIL, ZESTRIL) tablet 5 mg  5 mg Oral DAILY    tamsulosin (FLOMAX) capsule 0.4 mg  0.4 mg Oral DAILY    ondansetron (ZOFRAN) injection 4 mg  4 mg IntraVENous Q4H PRN    nitroglycerin (NITROSTAT) tablet 0.4 mg  0.4 mg SubLINGual Q5MIN PRN    alcohol 62% (NOZIN) nasal  1 Ampule  1 Ampule Topical Q12H    sodium chloride (NS) flush 5-40 mL  5-40 mL IntraVENous Q8H    sodium chloride (NS) flush 5-40 mL  5-40 mL IntraVENous PRN    acetaminophen (TYLENOL) tablet 650 mg  650 mg Oral Q4H PRN    morphine injection 2 mg  2 mg IntraVENous Q4H PRN    nitroglycerin (NITROSTAT) tablet 0.4 mg  0.4 mg SubLINGual Q5MIN PRN    aspirin chewable tablet 81 mg  81 mg Oral DAILY    LORazepam (ATIVAN) tablet 1 mg  1 mg Oral Q6H PRN    HYDROcodone-acetaminophen (NORCO) 5-325 mg per tablet 1 Tab  1 Tab Oral Q4H PRN       Data Review:   Recent Results (from the past 24 hour(s))   EKG, 12 LEAD, INITIAL    Collection Time: 11/21/20  4:11 PM   Result Value Ref Range    Ventricular Rate 73 BPM    Atrial Rate 73 BPM    P-R Interval 380 ms    QRS Duration 132 ms    Q-T Interval 418 ms    QTC Calculation (Bezet) 460 ms    Calculated P Axis 15 degrees    Calculated R Axis -28 degrees    Calculated T Axis 129 degrees    Diagnosis       Sinus rhythm with 1st degree A-V block  Left bundle branch block  Abnormal ECG  When compared with ECG of 20-NOV-2020 11:36,  No significant change was found  Confirmed by Eduardo Medina MD (), DEMETRIO GOMEZ ([id number]) on 11/21/2020 6:02:52 PM     PLEASE READ & DOCUMENT PPD TEST IN 72 HRS    Collection Time: 11/21/20 10:58 PM   Result Value Ref Range    PPD Negative Negative    mm Induration 0 0 - 5 mm   METABOLIC PANEL, BASIC    Collection Time: 11/22/20  3:46 AM   Result Value Ref Range    Sodium 139 136 - 145 mmol/L    Potassium 3.5 3.5 - 5.1 mmol/L    Chloride 106 98 - 107 mmol/L    CO2 25 21 - 32 mmol/L    Anion gap 8 7 - 16 mmol/L    Glucose 97 65 - 100 mg/dL    BUN 13 8 - 23 MG/DL    Creatinine 0.78 (L) 0.8 - 1.5 MG/DL    GFR est AA >60 >60 ml/min/1.73m2    GFR est non-AA >60 >60 ml/min/1.73m2    Calcium 8.5 8.3 - 10.4 MG/DL   CBC W/O DIFF    Collection Time: 11/22/20  3:46 AM   Result Value Ref Range    WBC 8.1 4.3 - 11.1 K/uL    RBC 3.58 (L) 4.23 - 5.6 M/uL    HGB 10.8 (L) 13.6 - 17.2 g/dL    HCT 33.2 (L) 41.1 - 50.3 %    MCV 92.7 79.6 - 97.8 FL    MCH 30.2 26.1 - 32.9 PG    MCHC 32.5 31.4 - 35.0 g/dL    RDW 14.3 11.9 - 14.6 %    PLATELET 043 153 - 750 K/uL    MPV 9.6 9.4 - 12.3 FL    ABSOLUTE NRBC 0.00 0.0 - 0.2 K/uL       EKG:  (EKG has been independently visualized by me with interpretation below): Profound FDAVB, with LBBB, QRSd = 132 msec

## 2020-11-22 NOTE — PROGRESS NOTES
Bedside shift change report given to Gordo Feldman RN (oncoming nurse) by Jo Ann Rodriguez (offgoing nurse). Report included the following information SBAR, Kardex, Procedure Summary, Intake/Output, MAR, Recent Results and Cardiac Rhythm NSR, first degree AVB, BBB.

## 2020-11-22 NOTE — PROGRESS NOTES
Bedside shift change report given to [address] (oncoming nurse) by Jacqueline Landa RN (offgoing nurse). Report included the following information SBAR, Kardex, Procedure Summary, Intake/Output, MAR, Recent Results and Cardiac Rhythm NSR, first degree av block, BBB.

## 2020-11-22 NOTE — PROGRESS NOTES
Verbal bedside report given to oncoming RN, Iris Nuñez. Patient's situation, background, assessment and recommendations provided. Opportunity for questions provided. Oncoming RN assumed care of patient.

## 2020-11-22 NOTE — PROGRESS NOTES
Bedside and Verbal shift change report given to self (oncoming nurse) by Chelsey Carrasquillo (offgoing nurse). Report included the following information SBAR, Kardex and MAR.

## 2020-11-23 ENCOUNTER — ANESTHESIA (OUTPATIENT)
Dept: SURGERY | Age: 80
DRG: 267 | End: 2020-11-23
Payer: MEDICARE

## 2020-11-23 ENCOUNTER — APPOINTMENT (OUTPATIENT)
Dept: GENERAL RADIOLOGY | Age: 80
DRG: 267 | End: 2020-11-23
Attending: INTERNAL MEDICINE
Payer: MEDICARE

## 2020-11-23 ENCOUNTER — ANESTHESIA EVENT (OUTPATIENT)
Dept: SURGERY | Age: 80
DRG: 267 | End: 2020-11-23
Payer: MEDICARE

## 2020-11-23 LAB
ATRIAL RATE: 74 BPM
CALCULATED P AXIS, ECG09: 14 DEGREES
CALCULATED R AXIS, ECG10: 17 DEGREES
CALCULATED T AXIS, ECG11: 148 DEGREES
DIAGNOSIS, 93000: NORMAL
P-R INTERVAL, ECG05: 354 MS
Q-T INTERVAL, ECG07: 428 MS
QRS DURATION, ECG06: 136 MS
QTC CALCULATION (BEZET), ECG08: 475 MS
VENTRICULAR RATE, ECG03: 74 BPM

## 2020-11-23 PROCEDURE — 74011250637 HC RX REV CODE- 250/637: Performed by: INTERNAL MEDICINE

## 2020-11-23 PROCEDURE — 77030031304 HC WAVWGD EIGR DISP INVO -D

## 2020-11-23 PROCEDURE — 02HL3JZ INSERTION OF PACEMAKER LEAD INTO LEFT VENTRICLE, PERCUTANEOUS APPROACH: ICD-10-PCS | Performed by: INTERNAL MEDICINE

## 2020-11-23 PROCEDURE — 74011250636 HC RX REV CODE- 250/636: Performed by: NURSE ANESTHETIST, CERTIFIED REGISTERED

## 2020-11-23 PROCEDURE — C1892 INTRO/SHEATH,FIXED,PEEL-AWAY: HCPCS

## 2020-11-23 PROCEDURE — 77030022704 HC SUT VLOC COVD -B

## 2020-11-23 PROCEDURE — 74011250637 HC RX REV CODE- 250/637: Performed by: PHYSICIAN ASSISTANT

## 2020-11-23 PROCEDURE — C1887 CATHETER, GUIDING: HCPCS

## 2020-11-23 PROCEDURE — 77030033067 HC SUT PDO STRATFX SPIR J&J -B

## 2020-11-23 PROCEDURE — 77030010507 HC ADH SKN DERMBND J&J -B

## 2020-11-23 PROCEDURE — 33225 L VENTRIC PACING LEAD ADD-ON: CPT

## 2020-11-23 PROCEDURE — C1894 INTRO/SHEATH, NON-LASER: HCPCS

## 2020-11-23 PROCEDURE — C1751 CATH, INF, PER/CENT/MIDLINE: HCPCS

## 2020-11-23 PROCEDURE — C1898 LEAD, PMKR, OTHER THAN TRANS: HCPCS

## 2020-11-23 PROCEDURE — 74011000250 HC RX REV CODE- 250: Performed by: INTERNAL MEDICINE

## 2020-11-23 PROCEDURE — 77030013687 HC GD NDL BARD -B

## 2020-11-23 PROCEDURE — 65660000004 HC RM CVT STEPDOWN

## 2020-11-23 PROCEDURE — 33208 INSRT HEART PM ATRIAL & VENT: CPT | Performed by: INTERNAL MEDICINE

## 2020-11-23 PROCEDURE — 33225 L VENTRIC PACING LEAD ADD-ON: CPT | Performed by: INTERNAL MEDICINE

## 2020-11-23 PROCEDURE — 76060000035 HC ANESTHESIA 2 TO 2.5 HR: Performed by: INTERNAL MEDICINE

## 2020-11-23 PROCEDURE — 74011000250 HC RX REV CODE- 250: Performed by: ANESTHESIOLOGY

## 2020-11-23 PROCEDURE — C2621 PMKR, OTHER THAN SING/DUAL: HCPCS

## 2020-11-23 PROCEDURE — 02H63JZ INSERTION OF PACEMAKER LEAD INTO RIGHT ATRIUM, PERCUTANEOUS APPROACH: ICD-10-PCS | Performed by: INTERNAL MEDICINE

## 2020-11-23 PROCEDURE — C1769 GUIDE WIRE: HCPCS

## 2020-11-23 PROCEDURE — 74011000272 HC RX REV CODE- 272: Performed by: INTERNAL MEDICINE

## 2020-11-23 PROCEDURE — 02HK3JZ INSERTION OF PACEMAKER LEAD INTO RIGHT VENTRICLE, PERCUTANEOUS APPROACH: ICD-10-PCS | Performed by: INTERNAL MEDICINE

## 2020-11-23 PROCEDURE — A4565 SLINGS: HCPCS

## 2020-11-23 PROCEDURE — 74011000636 HC RX REV CODE- 636: Performed by: INTERNAL MEDICINE

## 2020-11-23 PROCEDURE — 2709999900 HC NON-CHARGEABLE SUPPLY

## 2020-11-23 PROCEDURE — C1900 LEAD, CORONARY VENOUS: HCPCS

## 2020-11-23 PROCEDURE — 77030041279 HC DRSG PRMSL AG MDII -B

## 2020-11-23 PROCEDURE — 93005 ELECTROCARDIOGRAM TRACING: CPT | Performed by: INTERNAL MEDICINE

## 2020-11-23 PROCEDURE — 74011000258 HC RX REV CODE- 258: Performed by: ANESTHESIOLOGY

## 2020-11-23 PROCEDURE — 77030018547 HC SUT ETHBND1 J&J -B

## 2020-11-23 PROCEDURE — 71045 X-RAY EXAM CHEST 1 VIEW: CPT

## 2020-11-23 PROCEDURE — 33208 INSRT HEART PM ATRIAL & VENT: CPT

## 2020-11-23 PROCEDURE — 0JH607Z INSERTION OF CARDIAC RESYNCHRONIZATION PACEMAKER PULSE GENERATOR INTO CHEST SUBCUTANEOUS TISSUE AND FASCIA, OPEN APPROACH: ICD-10-PCS | Performed by: INTERNAL MEDICINE

## 2020-11-23 RX ORDER — LIDOCAINE HYDROCHLORIDE 10 MG/ML
30 INJECTION, SOLUTION EPIDURAL; INFILTRATION; INTRACAUDAL; PERINEURAL
Status: DISCONTINUED | OUTPATIENT
Start: 2020-11-23 | End: 2020-11-24 | Stop reason: HOSPADM

## 2020-11-23 RX ORDER — PROPOFOL 10 MG/ML
INJECTION, EMULSION INTRAVENOUS
Status: DISCONTINUED | OUTPATIENT
Start: 2020-11-23 | End: 2020-11-23 | Stop reason: HOSPADM

## 2020-11-23 RX ORDER — CEFAZOLIN SODIUM/WATER 2 G/20 ML
SYRINGE (ML) INTRAVENOUS AS NEEDED
Status: DISCONTINUED | OUTPATIENT
Start: 2020-11-23 | End: 2020-11-23 | Stop reason: HOSPADM

## 2020-11-23 RX ORDER — CEFAZOLIN SODIUM/WATER 2 G/20 ML
2 SYRINGE (ML) INTRAVENOUS EVERY 8 HOURS
Status: COMPLETED | OUTPATIENT
Start: 2020-11-24 | End: 2020-11-24

## 2020-11-23 RX ORDER — SODIUM CHLORIDE, SODIUM LACTATE, POTASSIUM CHLORIDE, CALCIUM CHLORIDE 600; 310; 30; 20 MG/100ML; MG/100ML; MG/100ML; MG/100ML
INJECTION, SOLUTION INTRAVENOUS
Status: DISCONTINUED | OUTPATIENT
Start: 2020-11-23 | End: 2020-11-23 | Stop reason: HOSPADM

## 2020-11-23 RX ORDER — ACETAMINOPHEN 325 MG/1
650 TABLET ORAL
Status: DISCONTINUED | OUTPATIENT
Start: 2020-11-23 | End: 2020-11-24 | Stop reason: HOSPADM

## 2020-11-23 RX ORDER — HYDROCODONE BITARTRATE AND ACETAMINOPHEN 5; 325 MG/1; MG/1
1 TABLET ORAL
Status: DISCONTINUED | OUTPATIENT
Start: 2020-11-23 | End: 2020-11-24 | Stop reason: HOSPADM

## 2020-11-23 RX ORDER — SODIUM CHLORIDE 0.9 % (FLUSH) 0.9 %
5-40 SYRINGE (ML) INJECTION EVERY 8 HOURS
Status: DISCONTINUED | OUTPATIENT
Start: 2020-11-23 | End: 2020-11-24 | Stop reason: HOSPADM

## 2020-11-23 RX ORDER — ONDANSETRON 2 MG/ML
4 INJECTION INTRAMUSCULAR; INTRAVENOUS
Status: DISCONTINUED | OUTPATIENT
Start: 2020-11-23 | End: 2020-11-24 | Stop reason: HOSPADM

## 2020-11-23 RX ORDER — FENTANYL CITRATE 50 UG/ML
INJECTION, SOLUTION INTRAMUSCULAR; INTRAVENOUS AS NEEDED
Status: DISCONTINUED | OUTPATIENT
Start: 2020-11-23 | End: 2020-11-23 | Stop reason: HOSPADM

## 2020-11-23 RX ORDER — SODIUM CHLORIDE 0.9 % (FLUSH) 0.9 %
5-40 SYRINGE (ML) INJECTION AS NEEDED
Status: DISCONTINUED | OUTPATIENT
Start: 2020-11-23 | End: 2020-11-24 | Stop reason: HOSPADM

## 2020-11-23 RX ORDER — DOCUSATE SODIUM 100 MG/1
100 CAPSULE, LIQUID FILLED ORAL
Status: DISCONTINUED | OUTPATIENT
Start: 2020-11-23 | End: 2020-11-24 | Stop reason: HOSPADM

## 2020-11-23 RX ADMIN — Medication 10 ML: at 21:03

## 2020-11-23 RX ADMIN — ROSUVASTATIN 20 MG: 20 TABLET, FILM COATED ORAL at 08:54

## 2020-11-23 RX ADMIN — CLOPIDOGREL BISULFATE 75 MG: 75 TABLET ORAL at 08:55

## 2020-11-23 RX ADMIN — SODIUM CHLORIDE, SODIUM LACTATE, POTASSIUM CHLORIDE, AND CALCIUM CHLORIDE: 600; 310; 30; 20 INJECTION, SOLUTION INTRAVENOUS at 16:32

## 2020-11-23 RX ADMIN — FENTANYL CITRATE 25 MCG: 50 INJECTION INTRAMUSCULAR; INTRAVENOUS at 17:09

## 2020-11-23 RX ADMIN — ASPIRIN 81 MG CHEWABLE TABLET 81 MG: 81 TABLET CHEWABLE at 08:54

## 2020-11-23 RX ADMIN — IOPAMIDOL 100 ML: 755 INJECTION, SOLUTION INTRAVENOUS at 17:40

## 2020-11-23 RX ADMIN — NEOMYCIN AND POLYMYXIN B SULFATES 1000 ML: 40; 200000 SOLUTION IRRIGATION at 17:41

## 2020-11-23 RX ADMIN — PROPOFOL 25 MCG/KG/MIN: 10 INJECTION, EMULSION INTRAVENOUS at 16:44

## 2020-11-23 RX ADMIN — ACETAMINOPHEN 650 MG: 325 TABLET, FILM COATED ORAL at 23:38

## 2020-11-23 RX ADMIN — Medication 2 G: at 16:52

## 2020-11-23 RX ADMIN — Medication 10 ML: at 05:09

## 2020-11-23 RX ADMIN — Medication 1 AMPULE: at 08:55

## 2020-11-23 RX ADMIN — TAMSULOSIN HYDROCHLORIDE 0.4 MG: 0.4 CAPSULE ORAL at 08:55

## 2020-11-23 RX ADMIN — CARVEDILOL 6.25 MG: 3.12 TABLET, FILM COATED ORAL at 08:55

## 2020-11-23 RX ADMIN — ACETAMINOPHEN 650 MG: 325 TABLET, FILM COATED ORAL at 00:07

## 2020-11-23 RX ADMIN — Medication 1 AMPULE: at 21:02

## 2020-11-23 RX ADMIN — LIDOCAINE HYDROCHLORIDE 30 ML: 10 INJECTION, SOLUTION EPIDURAL; INFILTRATION; INTRACAUDAL; PERINEURAL at 17:41

## 2020-11-23 RX ADMIN — LISINOPRIL 5 MG: 5 TABLET ORAL at 08:55

## 2020-11-23 RX ADMIN — DEXMEDETOMIDINE HYDROCHLORIDE 1 MCG/KG/HR: 100 INJECTION, SOLUTION INTRAVENOUS at 16:37

## 2020-11-23 NOTE — PROGRESS NOTES
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)      Overview: 11/2020:  26mm Palacios Sharita Ultra    Active Problems:    Coronary artery disease involving native coronary artery of native heart without angina pectoris (5/9/2016)      Overview: 2016:  2.25x28 Xience dLAD - moderate mLAD disease treated medically      2020:  Severe ostial LCx and small D1 and dLAD disease managed medically      Orthostatic hypotension (6/13/2016)      Pure hypercholesterolemia (6/27/2016)      Chronic combined systolic and diastolic congestive heart failure ([address] (9/11/2020)      Overview: 08/2020:  EF 40-50%      LBBB (left bundle branch block) (11/20/2020)        Plan:   1. Profound conduction disease with 2:1 AV block, high degree AV block s/p TAVR, LBBB - will tentatively plan for CRT-P today. NPO. Hibiclens wash and avoidance of heparin products. 2. Mild chronic systolic heart failure - EF 45-50%, continue GDMT. 3. CAD - complex PCI in 2016 and medically managed from 2020 [facility]. Continue P2Y12i, statin, OMT. 4. Severe AS s/p TAVR - stable function. PM/CRT  I discussed in detail the potential benefits of PM/CRT therapy with AV laura ablation, including definitive rate control with possible improved mortality, prevention of SCD,  decreased hospitalization, beneficial cardiac remodeling, and prevention of disease progression.  Additionally, I discussed with the patient the potential risks of PM/CRT implantation and subsequent AVN ablation, including the risk of bleeding, infection, venous occlusion, DVT/PE, pneumothorax, cardiac tamponade, perforation, need for urgent open heart surgery, device/lead failure or lead dislodgement with pacemaker dependence, inability to place an LV lead via the coronary sinus, inappropriate shock(s), heart attack, stroke, arrhythmia, radiation skin injury, kidney damage/failure, oversedation, respiratory arrest, and even death. The patient understands these risks in the context of the potential benefits of the device implantation and subsequent ablation, and agrees to proceed. TOTAL TIME: 25 minutes, >50% during counseling and coordination of care      Thank you for allowing me to participate in the electrophysiologic care of this most pleasant patient. Please feel free to contact me if there are any questions or concerns. Raffi East. Nusrat Razo MD, MS  Clinical Cardiac Electrophysiology  [facility] Cardiology    Subjective:   No complaints this AM, no chest pain or shortness of breath. Mild baseline confusion/dementia. Interval History: (History of pertinent interval events obtained from nursing staff)    ROS:  GEN:  No fever or chills  Cardiovascular:  As noted above  Pulmonary:  As noted above  Neuro:  No new focal motor or sensory loss      Objective:     Vitals:    11/22/20 1552 11/22/20 1922 11/22/20 2211 11/23/20 0301   BP: 125/69 (!) 115/58 117/78 108/64   Pulse: 76 79 86 67   Resp: 20 18 18 18   Temp: 98.9 °F (37.2 °C) 98.6 °F (37 °C) 98.2 °F (36.8 °C) 97.2 °F (36.2 °C)   SpO2: 96% 95% 94% 92%   Weight:    202 lb (91.6 kg)   Height:           Physical Exam:  General-Well Developed, Well Nourished, No Acute Distress, Alert & Oriented x 3, appropriate mood. Neck- supple, no JVD  CV- regular rate and rhythm no MRG  Lung- clear bilaterally  Abd- soft, nontender, nondistended  Ext- no edema bilaterally.   Skin- warm and dry    Current Facility-Administered Medications   Medication Dose Route Frequency    rosuvastatin (CRESTOR) tablet 20 mg  20 mg Oral DAILY    carvediloL (COREG) tablet 6.25 mg  6.25 mg Oral BID WITH MEALS    clopidogreL (PLAVIX) tablet 75 mg  75 mg Oral DAILY    lisinopriL (PRINIVIL, ZESTRIL) tablet 5 mg  5 mg Oral DAILY    tamsulosin (FLOMAX) capsule 0.4 mg  0.4 mg Oral DAILY    ondansetron (ZOFRAN) injection 4 mg  4 mg IntraVENous Q4H PRN    nitroglycerin (NITROSTAT) tablet 0.4 mg  0.4 mg SubLINGual Q5MIN PRN    alcohol 62% (NOZIN) nasal  1 Ampule  1 Ampule Topical Q12H    sodium chloride (NS) flush 5-40 mL  5-40 mL IntraVENous Q8H    sodium chloride (NS) flush 5-40 mL  5-40 mL IntraVENous PRN    acetaminophen (TYLENOL) tablet 650 mg  650 mg Oral Q4H PRN    morphine injection 2 mg  2 mg IntraVENous Q4H PRN    nitroglycerin (NITROSTAT) tablet 0.4 mg  0.4 mg SubLINGual Q5MIN PRN    aspirin chewable tablet 81 mg  81 mg Oral DAILY    LORazepam (ATIVAN) tablet 1 mg  1 mg Oral Q6H PRN    HYDROcodone-acetaminophen (NORCO) 5-325 mg per tablet 1 Tab  1 Tab Oral Q4H PRN       Data Review:   Recent Results (from the past 24 hour(s))   EKG, 12 LEAD, INITIAL    Collection Time: 11/22/20 10:58 AM   Result Value Ref Range    Ventricular Rate 77 BPM    Atrial Rate 77 BPM    P-R Interval 352 ms    QRS Duration 136 ms    Q-T Interval 412 ms    QTC Calculation (Bezet) 466 ms    Calculated P Axis -2 degrees    Calculated R Axis -4 degrees    Calculated T Axis 154 degrees    Diagnosis       Sinus rhythm with profound first degree AV block   Left bundle branch block  Abnormal ECG  When compared with ECG of 21-NOV-2020 16:11,  No significant change was found  Confirmed by Isaiah Dave ([id number]) on 11/22/2020 5:07:13 PM         EKG:  (EKG has been independently visualized by me with interpretation below): Profound FDAVB, with LBBB, QRSd = 132 msec

## 2020-11-23 NOTE — PROCEDURES
: Chacha Hough. Jocy Medina MD     REFERRING: Arabella Willams MD and Nayana Marroquin MD     Pre-Procedure Diagnosis  1. Complete heart block. 2. LBBB  3. Dilated cardiomyopathy, EF 45-50%  4. Severe aortic stenosis s/p TAVR  5. NYHA class II-III symptoms. Procedure Performed  1. Biventricular pacemaker implant. Anesthesia: MAC      Estimated Blood Loss: Less than 10 mL          Specimens: * No specimens in log *      Complications: None     Contrast:  70 ml    Fluoroscopy Time: 7.5 minutes     The procedure, indications, risks, benefits, and alternatives were discussed with the patient and family members, who desired to proceed after questions were answered and informed consent was documented. Procedure: After informed consent was obtained, the patient was brought to the Electrophysiology Laboratory in a fasting state and was prepped and draped in sterile fashion. Prophylactic antibiotic was administered 10 minutes prior to skin incision: refer to anesthesia procedural documentation for details. MAC was administered by the anesthesia team with continuous oxygen saturation measurement and blood pressure measurement. A venogram was performed of the LUE and demonstrated a patent axillary and subclavian vein system. Local anesthetic (sensorcaine) was delivered to the left pectoral region and an incision was made parallel to the deltopectoral groove and a pocket was fashioned by blunt dissection. Bovie electrocautery was used for hemostasis. Access x 3 (Micropuncture kit) was obtained under ultrasound guidance using a modified Seldinger technique with placement of two short wires and a long wire down into the RA and advanced below the diaphragm followed by placement of a 6 Fpeel-away sheath over the short guidewire. The right ventricular pace/sense lead was advanced into the right ventricle under fluoroscopy. The helix was advanced into the RV septum.  After confirmation of stable pacing characteristics, the sheath was peeled and the lead was sutured to the base of the pocket using 0-Ethibond. Another 6 Fr safe sheath was inserted over a short J wire. A pace/sense was advanced into the right atrial appendage under fluoroscopy and the helix was deployed. The lead demonstrated stable pacing characteristics and the lead was sutured to the base of the pocket with 0-Ethibond around the lead collar. Next, a Harrells sheath was advanced over the long J wire and dilator. The dilator was removed and the braided core was advanced into the RA and then RV. The CS was cannulated using the [id number]. An occlusive coronary sinus venogram was then performed demonstrating a posterolateral target. A 0.35 Amplatz wire was then advanced into the distal CS for added support. The inner and renal sheaths were then advanced into the proximal CS. Using selective when necessary, the posterolateral target was accessed with the use of a Whisper wire and the inner sheaths were carefully advanced into the target branch. The inner sheath was then removed. The left ventricular lead was then advanced with into the posterolateral branch over a Choice PT wire. Adequate thresholds were obtained with an adequate margin between phrenic stim and loss of capture. The delivery sheaths were then slit or peeled with fluoroscopy demonstrating stable position. The lead was then sutured to the underlying pectoralis fascia using 0-Ethibond sutures around the lead collar. The leads tested via the pacing system analyzer (PSA) demonstrating stable sensing, impedance and pacing thresholds. The lead pins were then cleaned with antibiotic soaked gauze, dried gently, and attached to a new biventricular pacemaker generator. Pins were directly observed to pass the tip electrode, and the ring hex wrench screws were secured, and leads tug tested.  The device and leads were gently positioned within the pocket after the pocket was irrigated copiously with a saline antimicrobial solution. A retention suture was placed. The wound was closed with multiple layers of 3-0 Stratafix suture followed by skin closure with 4-0 V-Loc. Dermabond solution was placed over the wound, allowed to dry, and then a sterile dressing was placed over the wound. Fluoroscopic images were interpreted by me, in multiple projections. Final device position was confirmed by stored fluoroscopic image from device pocket to lead tips in AP projection. The patient tolerated the procedure well and was transferred to the recovery area in stable condition. Device and Lead Information  Pulse Generator Model #  Serial # Location Implant   [id number] [facility] [id number] Left Pectoral New     Lead Model Number  Serial Number Lead position Implant   RA [id number] [facility] [id number] [place] New   RV [id number] [facility] [id number] RV [place] New   LV [id number] [facility] [id number] Lateral New   :  Lead Sensitivity and Threshold  Lead R or P sensitivity (mv) Threshold (V) Threshold PW (msec) Impedance (ohms) Final output Voltage (V) Final PW (msec)   RA 2.1 0.75 0.4 490 2.5 0.5   RV 11.5 0.75 0.4 600 2.5 0.5   LV - 0.50 0.4 730 2.5 0.5     Bradycardia Settings  Oscar Mode LRL URL Pace AVD (ms) Sense AVD (ms) Rate Response Mode Switching Mode SW Rate   DDD 60 120 170 140 Y Y 180     RV-LV Conduction  4: 115ms  3: 108ms  2: 107ms  1: 107ms    IMPRESSION: Successful implantation of [employer] biventricular permanent pacemaker. PLAN:  -Transfer to telemetry/stepdown unit.  -Routine CIED instructions.  -Device clinic follow up in 1-2 weeks. -Abx at discharge.  -Routine cardiac care per Dr. Leonides Jackson. Jolynn Alarcon.  Hussain Kwok MD, [address] [facility] Cardiac Electrophysiology  [facility] Cardiology

## 2020-11-23 NOTE — PROGRESS NOTES
CM continues to follow for discharge planning and/or CM needs. At this time, plan remains pt will transition home with family when stable for discharge. Pt to have pacemaker procedure this day. CM to continue to monitor and update as needed.

## 2020-11-23 NOTE — ANESTHESIA PREPROCEDURE EVALUATION
Anesthetic History               Review of Systems / Medical History  Patient summary reviewed and pertinent labs reviewed    Pulmonary          Shortness of breath      Comments: Remote h/o sarcoidosis. Has \"scar tissue\" per daughter. Neuro/Psych         TIA (questionable) and dementia (Mild)     Cardiovascular            Dysrhythmias   CAD and cardiac stents (x1 - '16)    Exercise tolerance: <4 METS: He is wheelchair bound and requires total assistance out of wheelchair. Comments: Orthostatic hypotension    Cath 10/14/20 showed  1. Mild LV systolic dysfunction  2. Severe diagonal pob-zo-ebarew LAD (95%) and ostial left circumflex (95) disease    Echo showed LVEF 45-50%, mod to severe AS, mild to mod MR, RVSP 47mmHg    Now s/p TAVR   GI/Hepatic/Renal     GERD: well controlled           Endo/Other        Obesity and arthritis     Other Findings              Physical Exam    Airway  Mallampati: III  TM Distance: 4 - 6 cm  Neck ROM: normal range of motion   Mouth opening: Normal     Cardiovascular    Rhythm: regular  Rate: normal    Murmur: Grade 2, Aortic area     Dental    Dentition: Upper partial plate     Pulmonary  Breath sounds clear to auscultation               Abdominal  GI exam deferred       Other Findings            Anesthetic Plan    ASA: 4  Anesthesia type: total IV anesthesia          Induction: Intravenous  Anesthetic plan and risks discussed with: Patient and Son / Daughter      Patient very high risk - severe CAD but any type of PCI was considered high risk. Evaluated for CABG/AVR, but deemed to high risk given functional status, so consesus was to proceed with TAVR given his recurrent syncope symptoms and hospitalizations. Had syncopal episode during PAT evaluation and rapid response called. Patient was put supine in a stretcher and VS, EKG, labs obtained and PIV access established. By the time help arrived syncopal episode had resolved.  Per daughter, these episodes have been the reason for recent frequent ER visits.

## 2020-11-23 NOTE — PROGRESS NOTES
Bedside and Verbal shift change report given to self (oncoming nurse) by Romayne Precise (offgoing nurse). Report included the following information SBAR, Kardex and MAR.

## 2020-11-24 VITALS
RESPIRATION RATE: 17 BRPM | HEART RATE: 77 BPM | HEIGHT: 72 IN | DIASTOLIC BLOOD PRESSURE: 78 MMHG | TEMPERATURE: 97.6 F | WEIGHT: 200.62 LBS | OXYGEN SATURATION: 95 % | SYSTOLIC BLOOD PRESSURE: 131 MMHG | BODY MASS INDEX: 27.17 KG/M2

## 2020-11-24 LAB
ATRIAL RATE: 89 BPM
CALCULATED P AXIS, ECG09: 36 DEGREES
CALCULATED R AXIS, ECG10: -94 DEGREES
CALCULATED T AXIS, ECG11: 70 DEGREES
DIAGNOSIS, 93000: NORMAL
P-R INTERVAL, ECG05: 184 MS
Q-T INTERVAL, ECG07: 432 MS
QRS DURATION, ECG06: 136 MS
QTC CALCULATION (BEZET), ECG08: 525 MS
VENTRICULAR RATE, ECG03: 89 BPM

## 2020-11-24 PROCEDURE — 74011250637 HC RX REV CODE- 250/637: Performed by: INTERNAL MEDICINE

## 2020-11-24 PROCEDURE — 74011250636 HC RX REV CODE- 250/636: Performed by: INTERNAL MEDICINE

## 2020-11-24 PROCEDURE — 93005 ELECTROCARDIOGRAM TRACING: CPT | Performed by: INTERNAL MEDICINE

## 2020-11-24 PROCEDURE — 97530 THERAPEUTIC ACTIVITIES: CPT

## 2020-11-24 PROCEDURE — 74011000250 HC RX REV CODE- 250: Performed by: INTERNAL MEDICINE

## 2020-11-24 PROCEDURE — 99024 POSTOP FOLLOW-UP VISIT: CPT | Performed by: INTERNAL MEDICINE

## 2020-11-24 RX ORDER — HYDROCODONE BITARTRATE AND ACETAMINOPHEN 5; 325 MG/1; MG/1
1 TABLET ORAL
Qty: 20 TAB | Refills: 0 | Status: SHIPPED | OUTPATIENT
Start: 2020-11-24 | End: 2020-11-29

## 2020-11-24 RX ADMIN — Medication 10 ML: at 05:39

## 2020-11-24 RX ADMIN — CEFAZOLIN SODIUM 2 G: 100 INJECTION, POWDER, LYOPHILIZED, FOR SOLUTION INTRAVENOUS at 02:02

## 2020-11-24 RX ADMIN — ASPIRIN 81 MG CHEWABLE TABLET 81 MG: 81 TABLET CHEWABLE at 09:11

## 2020-11-24 RX ADMIN — CEFAZOLIN SODIUM 2 G: 100 INJECTION, POWDER, LYOPHILIZED, FOR SOLUTION INTRAVENOUS at 10:00

## 2020-11-24 RX ADMIN — Medication 1 AMPULE: at 09:11

## 2020-11-24 RX ADMIN — ROSUVASTATIN 20 MG: 20 TABLET, FILM COATED ORAL at 09:11

## 2020-11-24 RX ADMIN — Medication 10 ML: at 05:40

## 2020-11-24 RX ADMIN — LISINOPRIL 5 MG: 5 TABLET ORAL at 09:13

## 2020-11-24 RX ADMIN — TAMSULOSIN HYDROCHLORIDE 0.4 MG: 0.4 CAPSULE ORAL at 09:11

## 2020-11-24 RX ADMIN — CLOPIDOGREL BISULFATE 75 MG: 75 TABLET ORAL at 09:11

## 2020-11-24 RX ADMIN — CARVEDILOL 6.25 MG: 3.12 TABLET, FILM COATED ORAL at 09:13

## 2020-11-24 RX ADMIN — ACETAMINOPHEN 650 MG: 325 TABLET, FILM COATED ORAL at 03:25

## 2020-11-24 NOTE — PROGRESS NOTES
Verbal bedside report given to oncoming RNPablito. Patient's situation, background, assessment and recommendations provided. Opportunity for questions provided. Oncoming RN assumed care of patient.

## 2020-11-24 NOTE — MANAGEMENT PLAN
79year old male s/p CRT-P with stable device function and CXR without complication. Will be working towards disposition. He will have a 1-2 week followup with the device office. Alix Gutierrez.  Francois Jade MD, [address] [facility] Cardiac Electrophysiology  [address] Cardiology

## 2020-11-24 NOTE — ANESTHESIA POSTPROCEDURE EVALUATION
Procedure(s):  PACEMAKER INSERTION. total IV anesthesia    Anesthesia Post Evaluation      Multimodal analgesia: multimodal analgesia used between 6 hours prior to anesthesia start to PACU discharge  Patient location during evaluation: bedside  Patient participation: complete - patient participated  Level of consciousness: awake  Pain score: 1  Pain management: adequate  Airway patency: patent  Anesthetic complications: no  Cardiovascular status: acceptable  Respiratory status: acceptable  Hydration status: acceptable  Comments: Patient doing well. Continue care on floor. Post anesthesia nausea and vomiting:  none  Final Post Anesthesia Temperature Assessment:  Normothermia (36.0-37.5 degrees C)      INITIAL Post-op Vital signs:   Vitals Value Taken Time   BP     Temp     Pulse 64 11/23/2020  7:50 PM   Resp     SpO2     Vitals shown include unvalidated device data.

## 2020-11-24 NOTE — PROGRESS NOTES
Problem: Mobility Impaired (Adult and Pediatric)  Goal: *Acute Goals and Plan of Care (Insert Text)  Note: Goals:  (1.)Mr. Luiz Terry will move from supine to sit and sit to supine , scoot up and down, and roll side to side with MINIMAL ASSIST within 3 treatment day(s). (2.)Mr. Luiz Terry will transfer from bed to chair and chair to bed with MINIMAL ASSIST using the least restrictive device within 3 treatment day(s). (3.)Mr. Luiz Terry will stand for 2-3 minutes with RW for UE support with upright standing posture within 3 treatment day(s). PHYSICAL THERAPY: Daily Note and AM 11/24/2020  INPATIENT: PT Visit Days : 3  [place] / [place] / Product Type: Medicare /       NAME/AGE/GENDER: Marquis Cruz is a 79 y.o. 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 Luiz Terry is an 79year old WF s/p Aortic Valve Replacement secondary to cardiac valve disease. His PMH includes Aortic stenosis, Arthritis, CAD, Claustrophobia, GERD, Heart failure, Ill-defined condition, Sarcoidosis of lymph nodes, Seizures ([facility] [address] Shortness of breath, Syncope and collapse, and Vertigo. He presents today sitting up in the bedside chair. Has sling for left UE. PCT present and giving bath and needing a little assistance getting to patient to stand.   Sit to stand with moderate assist x 2 as the patient has decreased upright posture. Needs a lot of  assist with lots of verbal cues for direction and participation and to not put weight through the LUE. Brief changed while the patient was standing. And then stood again to pull up his pants. Patient is returned to sitting in the recliner with alarm intact and needs within reach. He lives with her and her family in a 1 level home with 3 steps to enter. His PLOF has been primarily at a w/c level only standing and assisting with transitioning from the w/c to bed, toilet and chair. They have equipment at home for use as needed. The daughter reports he does not really ambulate at home. Mr. Hayden Sandhoff would benefit from continued skilled PT to maximize his functional abilities while in the hospital. Home with HHPT at discharge. Will continue PT efforts. This section established at most recent assessment   PROBLEM LIST (Impairments causing functional limitations):  1. Decreased Strength  2. Decreased ADL/Functional Activities  3. Decreased Transfer Abilities  4. Decreased Ambulation Ability/Technique  5. Decreased Activity Tolerance  6. Decreased Cognition   INTERVENTIONS PLANNED: (Benefits and precautions of physical therapy have been discussed with the patient.)  1. Bed Mobility  2. Gait Training  3. Therapeutic Activites  4. Therapeutic Exercise/Strengthening  5. Transfer Training     TREATMENT PLAN: Frequency/Duration: 3 times a week for duration of hospital stay  Rehabilitation Potential For Stated Goals: [facility] (at time of discharge pending progress):    Placement: It is my opinion, based on this patient's performance to date, that Mr. Hayden Sandhoff may benefit from [address] after discharge due to the functional deficits listed above that are likely to improve with skilled rehabilitation because he/she has multiple medical issues that affect his/her functional mobility in the community.   Equipment:    None at this time              HISTORY: History of Present Injury/Illness (Reason for Referral):  Patient is a 79 y.o. male presents with low flow/low gradient AS. EF 40-45%. He has diffuse pattern CAD and critical LCx disease. He was referred for CABG/AVR but due to severe debility he was determined he would be best severed with TAVR. CAD reviewed and felt ostial LCx disease was too high risk for PCI and he is being managed medically. He was admitted last week with syncope thought to be due to orthostasis and vagal spell. He has recovered and now here for TAVR. Past Medical History/Comorbidities:   Mr. Pedro Pablo Lanier  has a past medical history of Aortic stenosis, Arthritis, CAD (coronary artery disease), Claustrophobia, GERD (gastroesophageal reflux disease), Heart failure ([address] Ill-defined condition, Sarcoidosis of lymph nodes, Seizures ([address] Shortness of breath, Syncope and collapse, and Vertigo. Mr. Pedro Pablo Lanier  has a past surgical history that includes hx lymph node dissection; hx cataract removal (Bilateral); hx colonoscopy; pr cardiac surg procedure unlist; hx lap cholecystectomy; hx urological (2015); hx shoulder arthroscopy; and hx knee arthroscopy.   Social History/Living Environment:   Home Environment: Private residence  # Steps to Enter: 3  One/Two Story Residence: One story  Living Alone: No  Support Systems: Child(nona)  Patient Expects to be Discharged to[de-identified] Private residence  Current DME Used/Available at [facility]: Wheelchair, Shadi Zhang, [address], [place], straight, Commode, bedside  Prior Level of Function/Work/Activity:  Per family member, patient does not ambulate at home but does assist with transitioning from bed to/from W/C and/or BSC to/from W/C     Number of Personal Factors/Comorbidities that affect the Plan of Care: 3+: [address]:   Most Recent Physical Functioning:   Gross Assessment:                  Posture:     Balance:  Sitting: Impaired  Sitting - Static: Fair (occasional)  Sitting - Dynamic: Fair (occasional)  Standing: Impaired  Standing - Static: Constant support;Poor  Standing - Dynamic : Constant support;Poor Bed Mobility:     Wheelchair Mobility:     Transfers:  Sit to Stand: Moderate assistance;Assist x2  Stand to Sit: Moderate assistance;Assist x2  Gait:            Body Structures Involved:  1. Heart  2. Thoracic Cage Body Functions Affected:  1. Cardio  2. Movement Related Activities and Participation Affected:  1. General Tasks and Demands  2. Mobility  3. Self Care   Number of elements that affect the Plan of Care: 3: MODERATE COMPLEXITY   CLINICAL PRESENTATION:   Presentation: Evolving clinical presentation with changing clinical characteristics: MODERATE COMPLEXITY   CLINICAL DECISION MAKIN [facility] Mobility Inpatient Short Form  How much difficulty does the patient currently have. .. Unable A Lot A Little None   1. Turning over in bed (including adjusting bedclothes, sheets and blankets)? [] 1   [] 2   [x] 3   [] 4   2. Sitting down on and standing up from a chair with arms ( e.g., wheelchair, bedside commode, etc.)   [] 1   [x] 2   [] 3   [] 4   3. Moving from lying on back to sitting on the side of the bed? [] 1   [x] 2   [] 3   [] 4   How much help from another person does the patient currently need. .. Total A Lot A Little None   4. Moving to and from a bed to a chair (including a wheelchair)? [x] 1   [] 2   [] 3   [] 4   5. Need to walk in hospital room? [x] 1   [] 2   [] 3   [] 4   6. Climbing 3-5 steps with a railing? [x] 1   [] 2   [] 3   [] 4   © , Trustees of [address] [place] [address] [address] [id number], under license to [employer]. All rights reserved      Score:  Initial: 10 Most Recent: X (Date: -- )    Interpretation of Tool:  Represents activities that are increasingly more difficult (i.e. Bed mobility, Transfers, Gait).     Medical Necessity:     · Patient is expected to demonstrate progress in   · strength and functional technique  ·  to · decrease assistance required with bed mobility and SPT  · .  Reason for Services/Other Comments:  · Patient continues to require skilled intervention due to   · medical complications  · . Use of outcome tool(s) and clinical judgement create a POC that gives a: Questionable prediction of patient's progress: MODERATE COMPLEXITY            TREATMENT:   (In addition to Assessment/Re-Assessment sessions the following treatments were rendered)   Pre-treatment Symptoms/Complaints:  Patient confused but is pleasant   Pain: Initial: 0/10  Pain Intensity 1: 0  Post Session:  0/10       Therapeutic Activity: ( 9 minutes): Therapeutic activities including  sit to stand and standing and standing balance activities  to improve mobility and strength. Required moderate to max assist and max erbal cues to promote static and dynamic balance in standing. Braces/Orthotics/Lines/Etc:   · O2 Device: Room air  Treatment/Session Assessment:    · Response to Treatment:  Patient participated and tolerated therapy well. Needs a lot of coaxing for standing posture  · Interdisciplinary Collaboration:   o Physical Therapy Assistant  o Registered Nurse  · After treatment position/precautions:   o Up in chair  o Bed alarm/tab alert on  o Bed/Chair-wheels locked  o Call light within reach  o RN notified   · Compliance with Program/Exercises: Will assess as treatment progresses  · Recommendations/Intent for next treatment session: \"Next visit will focus on advancements to more challenging activities and reduction in assistance provided\".   Total Treatment Duration:  PT Patient Time In/Time Out  Time In: 0930  Time Out: 0939    Orbie Favre, PTA

## 2020-11-24 NOTE — PROGRESS NOTES
TRANSFER - OUT REPORT:    Verbal report given to Heidy Ellis on Molly Bañuelos  [facility]       Report consisted of patients Situation, Background, Assessment and   Recommendations(SBAR). Information from the following report(s) SBAR was reviewed with the receiving nurse. Lines:   Peripheral IV 11/20/20 Anterior;Proximal;Right Forearm (Active)   Site Assessment Clean, dry, & intact 11/23/20 1149   Phlebitis Assessment 0 11/23/20 1149   Infiltration Assessment 0 11/23/20 1149   Dressing Status Clean, dry, & intact 11/23/20 1149   Dressing Type Tape;Transparent 11/23/20 1149   Hub Color/Line Status Patent 11/23/20 1149   Alcohol Cap Used No 11/22/20 0319        Opportunity for questions and clarification was provided.

## 2020-11-24 NOTE — PROGRESS NOTES
Pt with discharge orders this day. Pt to discharge home with family and home health. Updated clinicals faxed to [facility] this day to alert to pt discharge. No additional CM needs at discharge. Milestones met. Care Management Interventions  PCP Verified by CM: Yes(Ana Medina)  Mode of Transport at Discharge: Other (see comment)(family)  Transition of Care Consult (CM Consult): Discharge Planning, [address]: No  Reason Outside [place]: Patient already serviced by other home care/hospice agency  Discharge Durable Medical Equipment: No  Physical Therapy Consult: Yes  Occupational Therapy Consult: No  Speech Therapy Consult: No  Current Support Network:  Other, Lives with Caregiver(Lives with pt daughter)  Confirm Follow Up Transport: Family  The Plan for Transition of Care is Related to the Following Treatment Goals : home with home health   The Patient and/or Patient Representative was Provided with a Choice of Provider and Agrees with the Discharge Plan?: Yes  Name of the Patient Representative Who was Provided with a Choice of Provider and Agrees with the Discharge Plan: Mrs. Paulette Saha - pt daughter  Tiff of Choice List was Provided with Basic Dialogue that Supports the Patient's Individualized Plan of Care/Goals, Treatment Preferences and Shares the Quality Data Associated with the Providers?: Yes  [employer] Information Provided?: No  Discharge Location  Discharge Placement: Home with home health

## 2020-11-24 NOTE — DISCHARGE SUMMARY
[address] Cardiology Discharge Summary     Patient ID:  Sandra Lujan  [id number]  85 y.o.  1940    Admit date: 11/17/2020    Discharge date:  11/18/2020    Admitting Physician: Siobhan Hoover MD     Discharge Physician: Ever Thapa NP/Dr. Pascual Saenz    Admission Diagnoses: Aortic valve stenosis      Discharge Diagnoses:      Diagnosis    LBBB (left bundle branch block)    S/P TAVR (transcatheter aortic valve replacement)        Chronic combined systolic and diastolic congestive heart failure ([address]        Pure hypercholesterolemia    Orthostatic hypotension    Coronary artery disease involving native coronary artery of native heart without angina pectoris             Cardiology Procedures this admission:  Transcatheter aortic valve replacement, BiV PPM, Echo    Consults: EP    Hospital Course: Patient is an 79 y.o. y.o with severe symptomatic aortic stenosis. After a thorough preoperative evaluation by multidisciplinary valve board, the patient was felt to be elevated risk for surgical AVR. Based on this elevated risk, it was recommended patient proceed with transcatheter aortic valve replacement. This was discussed with patient and after a thorough discussion of all the risks and proposed benefits, the patient agreed to proceed. He was admitted for planned TAVR on 11/17. The patient underwent successful balloon valvuloplasty and transcatheter aortic valve replacement with 26 mm [place] Sharita S3 valve via a right femoral aproach. The patient tolerated the procedure well and was taken to the telemetry floor for recovery. The following morning, patient was up feeling well without any complaints of chest pain or shortness of breath. Patient's labs were stable. Echocardiogram showed normal function of bioprosthesis. The patient has debility at home but had increased trouble and was not able to pivot transfer at potential discharge date.   The patient stayed in the hospital for further evaluation by PT/OT that recommended. EP was consulted for profound FDAVB bradycardia with a run of 2:1 AV block and assiciated LBBB. The patient was taken to the EP lab by Dr Marcy Johnson on 11/23 with a SJM BiV PPM placed. The next day the patients device showed normal function. The patient CXR that showed no evidence of pneumothorax. Patient was seen and examined by Dr. Beba Dickson and determined stable and ready for discharge. Patient was instructed on the importance of medication compliance including dual anti-platelet therapy for at least 6 months. Bing has known dementia at baseline and is being DC home with dtr. The patient will have transitional care follow up with [facility] Cardiology Dr. Carol Puente for a transitional appt at the [place] office. The patient will also follow up with the device clinic as well. A repeat echocardiogram will be obtained in 1 month. DISPOSITION: The patient is being discharged home in stable condition on a low saturated fat, low cholesterol and low salt diet. The patient is instructed to advance activities as tolerated to the limit of fatigue or shortness of breath. The patient is instructed to avoid lifting anything heavier than 10 lbs for 2 weeks. The patient is instructed to avoid any straining, stooping or squatting for 2 weeks. The patient is instructed not to drive for 1 week. The patient is instructed to watch the groin site for bleeding/oozing; if seen, the patient is instructed to apply firm pressure with a clean cloth and call [facility] Cardiology at [phone]. The patient is instructed to watch for signs of infection which include: increasing area of redness, fever/hot to touch or purulent drainage at the groin site. The patient is instructed not to soak in a bathtub for 7-10 days, but is cleared to shower. The patient is instructed to return to the ER immediately for any severe pain, color change, or temperature change in leg.      The patient is informed that prophylaxis for bacterial endocarditis is recommended for high-risk procedures such as all dental procedures that involve manipulation of gingival tissue, the periapical region of teeth, or perforation of the oral mucosa. Discharge Exam:   Visit Vitals  /78   Pulse 77   Temp 97.6 °F (36.4 °C)   Resp 17   Ht 6' (1.829 m)   Wt 91 kg (200 lb 9.9 oz)   SpO2 95%   BMI 27.21 kg/m²     Patient has been seen by Dr. Vickey Plata: see his progress note for exam details. No results found for this or any previous visit (from the past 24 hour(s)). Patient Instructions:     Current Discharge Medication List      START taking these medications    Details   HYDROcodone-acetaminophen (NORCO) 5-325 mg per tablet Take 1 Tab by mouth every six (6) hours as needed for Pain for up to 5 days. Max Daily Amount: 4 Tabs. Qty: 20 Tab, Refills: 0    Associated Diagnoses: Biventricular ICD (implantable cardioverter-defibrillator) in place      lisinopriL (PRINIVIL, ZESTRIL) 5 mg tablet Take 1 Tab by mouth daily. Qty: 30 Tab, Refills: 3      rosuvastatin (CRESTOR) 20 mg tablet Take 1 Tab by mouth daily. Qty: 30 Tab, Refills: 3         CONTINUE these medications which have NOT CHANGED    Details   tamsulosin (FLOMAX) 0.4 mg capsule TAKE 1 CAPSULE BY MOUTH ONCE DAILY WITH SUPPER  Qty: 90 Cap, Refills: 0      carvediloL (COREG) 6.25 mg tablet Take 1 Tab by mouth two (2) times daily (with meals). Qty: 60 Tab, Refills: 3      clopidogreL (Plavix) 75 mg tab Take 75 mg by mouth daily. Indications: continued after cath per Dr. Vickey Plata      acetaminophen (TYLENOL) 500 mg tablet Take 1,000 mg by mouth.       DIGEST PROBIOTIC, S.BOULARDII, 250 mg capsule TAKE 1 CAPSULE BY MOUTH TWICE A DAY      ergocalciferol (ERGOCALCIFEROL) 50,000 unit capsule TAKE 1 CAPSULE BY MOUTH ONCE EVERY SEVEN DAYS  Qty: 12 Cap, Refills: 11    Comments: Please consider 90 day supplies to promote better adherence      aspirin delayed-release 81 mg tablet Take 81 mg by mouth daily. nystatin (MYCOSTATIN) topical cream Apply  to affected area two (2) times a day. Qty: 45 g, Refills: 3      nitroglycerin (NITROSTAT) 0.4 mg SL tablet 1 Tab by SubLINGual route every five (5) minutes as needed for Chest Pain.   Qty: 1 Bottle, Refills: 11               Signed:  UMANG Villagomez  11/24/2020  8:06 AM

## 2020-11-24 NOTE — PROGRESS NOTES
Verbal report given to oncoming RN, Hodan Gong. Patient is still in [facility]. Patient's situation, background, assessment and recommendations provided. Opportunity for questions provided. Oncoming RN assumed care of patient.

## 2020-11-24 NOTE — DISCHARGE INSTRUCTIONS
The patient is being discharged home in stable condition on a low saturated fat, low cholesterol and low salt diet. The patient is instructed to advance activities as tolerated to the limit of fatigue or shortness of breath. The patient is instructed to avoid lifting anything heavier than 10 lbs for 2 weeks. The patient is instructed to avoid any straining, stooping or squatting for 2 weeks. The patient is instructed not to drive for 1 week. The patient is instructed to watch the groin site for bleeding/oozing; if seen, the patient is instructed to apply firm pressure with a clean cloth and call [facility] at [phone]. The patient is instructed to watch for signs of infection which include: increasing area of redness, fever/hot to touch or purulent drainage at the groin site. The patient is instructed not to soak in a bathtub for 7-10 days, but is cleared to shower. The patient is instructed to return to the ER immediately for any severe pain, color change, or temperature change in leg.      The patient is informed that prophylaxis for bacterial endocarditis is recommended for high-risk procedures such as all dental procedures that involve manipulation of gingival tissue, the periapical region of teeth, or perforation of the oral mucosa. HOME INSTRUCTION FOLLOWING TRANSCATHETER AORTIC VALVE REPLACEMENT (TAVR)    What is my main problem? 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 Below is what you need to do when you go home. What do I need to do? ACTIVITY:  · Weigh yourself every day in the morning after using the bathroom. · Get up and get dressed every day. Do not stay in bed. · Set a daily routine. · When you are tired, rest.  · NO DRIVING For 1 WEEK. You may ride in a car. · Please let your doctor know if you need to leave town before your first follow-up visit.   · Do NOT lift more than five to ten pounds, No Straining, Stooping, or Squatting for two weeks. · Do not climb stairs but you can do a few steps if needed for 2 weeks. · Walk as much as you can. ·   Cough and deep breathe, and use your incentive spirometer 10 times each hour when you are       awake. DIET:  · We recommend the Cardiac diet. Your nurse can provide a printed handout to you on this diet. INCISION CARE:    · Shower every day. No tub baths for the first week you are at home. · Cleanse wounds with mild soap and water. Keep wounds dry. · A small amount of bloody or clear drainage is normal.  · Watch for signs of infection: redness, incision hot to the touch, fever greater than 101 degrees, swelling at the groin incision site, or discolored drainage from your incision. MEDICATIONS:    · DO NOT STOP TAKING YOUR MEDICINES WITHOUT SPEAKING WITH  YOUR CARDIOLOGIST! · Take your pills as ordered at the time of discharge. · Do  not stop taking any medicine without first discussing it with your doctor. · Avoid all over the counter medications, herbal, and natural supplements unless you have discussed them with your doctor. · It is important to follow your doctors orders, especially if blood thinning drugs are prescribed. Your doctor will monitor your medicine and advise you when or if you can stop taking       Why is it important for me to do this? ·  Regular check-ups by your cardiologist are very important. It is easier for patients with a heart   valve replacement to get infections, which could lead to future heart damage. ·  Before any dental work or surgery is done, tell your dentist or surgeon about your heart valve replacement. You may need to take antibiotics before and after some medical procedures to reduce the risk of infection. ·  Always tell the doctor (or medical technician) that you have an implanted heart Valve. What can I expect? HEALTHY HABITS: To maintain a healthy physical condition, we have the following suggestions:  · No smoking!!!  If you need help to stop smoking, please contact your doctor. · Attempt to achieve and maintain your ideal body weight. ·    Walking is great exercise for the body and the mind! We suggest that you walk as much as you can    When to call the doctor or speak to the nurse:    · Weight gain of 3 pounds in one day or 5 pounds in one week. Weight gain is NOT normal.  · Swelling of the legs, ankles, or feet  · Increasing shortness of breath  · Change in the color or temperature of your lower legs and feet. · Develop abdominal pain or unrelieved nausea and/or vomiting. · Develop any numbness, tingling, or limited movement of your arms or legs. · Signs of infection: redness, incision hot to the touch, fevers greater than 101 degrees, or colored drainage from your incision. · Seroma is an accumulation of fluid in the tissues at the groin surgical site. Symptoms may include: a lump near the groin surgical site, clear fluid draining from the site, redness, warmth, or swelling. ADDITIONAL INFORMATION:  · Your follow-up echocardiogram has been scheduled along with your 30 day TAVR follow up visit. This visit is very important for you, so be sure to attend this visit. We are here for you! If you have any questions, please feel free to call your physician.  His office number is:    Dr. Idalmis Bhardwaj  [phone]                   Valve Coordinator- Shikha Hurtado - (Aloma Minor from Nurse    PATIENT INSTRUCTIONS:    Report the following to your surgeon:  · Excessive pain, swelling, redness or odor of or around the surgical area  · Temperature over 100.5  · Nausea and vomiting lasting longer than 4 hours or if unable to take medications  · Any signs of decreased circulation or nerve impairment to extremity: change in color, persistent  numbness, tingling, coldness or increase pain  · Any questions    What to do at Home:  *  Please give a list of your current medications to your Primary Care Provider. *  Please update this list whenever your medications are discontinued, doses are      changed, or new medications (including over-the-counter products) are added. *  Please carry medication information at all times in case of emergency situations. These are general instructions for a healthy lifestyle:    No smoking/ No tobacco products/ Avoid exposure to second hand smoke  Surgeon General's Warning:  Quitting smoking now greatly reduces serious risk to your health. Obesity, smoking, and sedentary lifestyle greatly increases your risk for illness    A healthy diet, regular physical exercise & weight monitoring are important for maintaining a healthy lifestyle    You may be retaining fluid if you have a history of heart failure or if you experience any of the following symptoms:  Weight gain of 3 pounds or more overnight or 5 pounds in a week, increased swelling in our hands or feet or shortness of breath while lying flat in bed. Please call your doctor as soon as you notice any of these symptoms; do not wait until your next office visit. The discharge information has been reviewed with the patient and caregiver. The patient and caregiver verbalized understanding. Discharge medications reviewed with the patient and caregiver and appropriate educational materials and side effects teaching were provided. ___________________________________________________________________________________________________________________________________           Patient Education        Biventricular Pacemaker Placement: What to Expect at [address] [place] [address] pacemaker placement is surgery to put a biventricular pacemaker in your chest. Your doctor made a cut (incision) just below your collarbone.  The doctor put the pacemaker leads through the cut, into a large blood vessel, then into the heart. The doctor put the pacemaker under the skin of your chest and attached the leads to it. Your chest may be sore where the doctor made the cut. You also may have a bruise and mild swelling. These symptoms usually get better in 1 to 2 weeks. You may feel a hard ridge along the incision. This usually gets softer in the months after surgery. You may be able to see or feel the outline of the pacemaker under your skin. You will probably be able to go back to work or your usual routine 1 to 2 weeks after surgery. Pacemaker batteries usually last 5 to 15 years. Your doctor will talk to you about how often you will need to have your pacemaker checked. You'll need to take steps to safely use electric devices. Some of these devices can stop your pacemaker from working right for a short time. Check with your doctor about what to avoid and what to keep a short distance away from your pacemaker. For example, you will need to stay away from things with strong magnetic and electrical fields. An example is an MRI machine (unless your pacemaker is safe for an MRI). You can use a cellphone and other wireless devices, but keep them at least 6 inches away from your pacemaker. Many household and office electronics don't affect a pacemaker. These include kitchen appliances and computers. This care sheet gives you a general idea about how long it will take for you to recover. But each person recovers at a different pace. Follow the steps below to get better as quickly as possible. How can you care for yourself at home? Activity    · Rest when you feel tired.     · Be active. Walking is a good choice.     · For 4 to 6 weeks:  ? Avoid activities that strain your chest or upper arm muscles. This includes pushing a  or vacuum, mopping floors, swimming, or swinging a golf club or tennis racquet.   ? Do not raise your arm (the one on the side of your body where the pacemaker is located) above your shoulder. ? Allow your body to heal. Don't move quickly or lift anything heavy until you are feeling better.     · Many people are able to return to work within 1 to 2 weeks after surgery.     · Ask your doctor when it is okay for you to have sex. Diet    · You can eat your normal diet. If your stomach is upset, try bland, low-fat foods like plain rice, broiled chicken, toast, and yogurt. Medicines    · Your doctor will tell you if and when you can restart your medicines. You will also get instructions about taking any new medicines.     · If you take aspirin or some other blood thinner, ask your doctor if and when to start taking it again. Make sure that you understand exactly what your doctor wants you to do.     · Be safe with medicines. Read and follow all instructions on the label. ? If the doctor gave you a prescription medicine for pain, take it as prescribed. ? If you are not taking a prescription pain medicine, ask your doctor if you can take an over-the-counter medicine. ? Do not take aspirin, ibuprofen (Advil, Motrin), naproxen (Aleve), or other nonsteroidal anti-inflammatory drugs (NSAIDs) unless your doctor says it is okay.     · If your doctor prescribed antibiotics, take them as directed. Do not stop taking them just because you feel better. You need to take the full course of antibiotics. Incision care    · If you have strips of tape on the incision, leave the tape on for a week or until it falls off.     · Keep the incision dry while it heals. Your doctor may recommend sponge baths for about 7 days, but do not get the incision wet. Your doctor will let you know when you may take showers. After a shower, pat the incision dry.     · Don't use hydrogen peroxide or alcohol on the incision, which can slow healing. You may cover the area with a gauze bandage if it oozes fluid or rubs against clothing.  Change the bandage every day.     · Do not take a bath or get into a hot tub for the first 2 weeks, or until your doctor tells you it is okay. Other instructions    · Keep a medical ID card with you at all times that says you have a pacemaker. The card should include the  and model information.     · Wear medical alert jewelry stating that you have a pacemaker. You can buy this at most drugstores.     · Check your pulse as directed by your doctor.     · Have your pacemaker checked as often as your doctor recommends. In some cases, this may be done over the phone or online. Your doctor will give you instructions about how to do this. Follow-up care is a key part of your treatment and safety. Be sure to make and go to all appointments, and call your doctor if you are having problems. It's also a good idea to know your test results and keep a list of the medicines you take. When should you call for help? Call 911 anytime you think you may need emergency care. For example, call if:    · You passed out (lost consciousness).     · You have trouble breathing. Call your doctor now or seek immediate medical care if:    · You are dizzy or light-headed, or you feel like you may faint.     · You have pain that does not get better after you take pain medicine.     · You hear an alarm or feel a vibration from your pacemaker.     · You have loose stitches, or your incision comes open.     · Bright red blood has soaked through the bandage over your incision.     · You have signs of infection, such as:  ? Increased pain, swelling, warmth, or redness. ? Red streaks leading from the incision. ? Pus draining from the incision. ? A fever. Watch closely for changes in your health, and be sure to contact your doctor if:    · You have any problems with your pacemaker. Where can you learn more? Go to http://www.gray.com/  Enter T984 in the search box to learn more about \"Biventricular Pacemaker Placement: What to Expect at Home. \"  Current as of: December 16, 8452               GXNESGV Version: 12.6  © 7604-4295 [employer]. Care instructions adapted under license by [employer] (which disclaims liability or warranty for this information). If you have questions about a medical condition or this instruction, always ask your healthcare professional. [address] any warranty or liability for your use of this information. Transcatheter Aortic Valve Replacement: What to Expect at Home  Your Recovery     Transcatheter aortic valve replacement (TAVR) is a procedure that replaces the aortic heart valve. Your doctor used a catheter to put in your new heart valve. After your aortic valve is replaced, you will spend a few days in the hospital. This will help you get your energy back and make sure that you're ready to go home. Most people can return to regular activities in 2 or 3 weeks. Your doctor may give you specific instructions on when you can do your normal activities again. Your doctor may suggest that you attend a cardiac rehab program. In cardiac rehab, a team of health professionals provides education and support to help you recover and prevent problems with your heart. Ask your doctor if rehab is right for you. This care sheet gives you a general idea about how long it will take for you to recover. But each person recovers at a different pace. Follow the steps below to feel better as quickly as possible. How can you care for yourself at home? Activity    · Do not do strenuous exercise and do not lift, pull, or push anything heavy until your doctor says it is okay. This may be for a day or two.     · If the catheter was placed in your groin, try not to walk up stairs for the first couple of days.     · Rest when you feel tired. Diet    · Eat a heart-healthy diet. If you have not been eating this way, talk to your doctor. You also may want to talk to a dietitian. They can help you learn about healthy foods.   · If your bowel movements are not regular right after the procedure, try to avoid constipation and straining. Drink plenty of water. Your doctor may suggest fiber, a stool softener, or a mild laxative. Medicines    · Your doctor will tell you if and when you can restart your medicines. You will also get instructions about taking any new medicines.     · If you take aspirin or some other blood thinner, ask your doctor if and when to start taking it again. Make sure that you understand exactly what your doctor wants you to do.     · Your doctor will likely prescribe blood-thinning medicines. Be sure to get instructions about how to take your medicine safely. Blood thinners can cause serious bleeding problems.     · Be safe with medicines. Take your medicines exactly as prescribed. Call your doctor if you think you are having a problem with your medicine. Care of the catheter site    · For 1 or 2 days, keep a bandage over the spot where the catheter was inserted. The bandage probably will fall off in this time.     · Put ice or a cold pack on the area for 10 to 20 minutes at a time to help with soreness or swelling. Put a thin cloth between the ice and your skin.     · You may shower 24 to 48 hours after the procedure, if your doctor okays it. Pat the incision dry.     · Do not soak the catheter site until it is healed. Don't take a bath for 1 week, or until your doctor tells you it is okay.     · Watch for bleeding from the site. A small amount of blood (up to the size of a quarter) on the bandage can be normal.     · If you are bleeding, lie down and press on the area for 15 minutes to try to make it stop. If the bleeding does not stop, call your doctor or seek immediate medical care. Other    · Be sure to tell all of your doctors and your dentist that you have an artificial aortic valve. This is important because you may need to take antibiotics before certain procedures to prevent infection.    Follow-up care is a key part of your treatment and safety. Be sure to make and go to all appointments, and call your doctor if you are having problems. It's also a good idea to know your test results and keep a list of the medicines you take. When should you call for help? Call 911 anytime you think you may need emergency care. For example, call if:    · You passed out (lost consciousness).     · You have symptoms of a heart attack. These may include:  ? Chest pain or pressure, or a strange feeling in the chest.  ? Sweating. ? Shortness of breath. ? Nausea or vomiting. ? Pain, pressure, or a strange feeling in the back, neck, jaw, or upper belly or in one or both shoulders or arms. ? A fast or irregular heartbeat. After you call 911, the  may tell you to chew 1 adult-strength or 2 to 4 low-dose aspirin. Wait for an ambulance. Do not try to drive yourself. Call your doctor now or seek immediate medical care if:    · You are bleeding from the area where the catheter was put in your artery.     · You have a fast-growing, painful lump at the catheter site.     · You have signs of infection, such as:  ? Increased pain, swelling, warmth, or redness. ? Red streaks leading from the catheter site. ? Pus draining from the catheter site. ? A fever.     · Your leg is painful, looks blue, or feels cold, numb, or tingly. Watch closely for changes in your health, and be sure to contact your doctor if you have any problems. Where can you learn more? Go to http://www.gray.com/  Enter P870 in the search box to learn more about \"Transcatheter Aortic Valve Replacement: What to Expect at Home. \"  Current as of: December 16, 2019               Content Version: 12.6  © 4327-2608 [employer]. Care instructions adapted under license by [employer] (which disclaims liability or warranty for this information).  If you have questions about a medical condition or this instruction, always ask your healthcare professional. [place] [address] any warranty or liability for your use of this information.

## 2020-11-24 NOTE — PROGRESS NOTES
Problem: Falls - Risk of  Goal: *Absence of Falls  Description: Document Darryl Click Fall Risk and appropriate interventions in the flowsheet.   11/24/2020 0051 by Maureen Shaw  Outcome: Progressing Towards Goal  Note: Fall Risk Interventions:  Mobility Interventions: Bed/chair exit alarm, Patient to call before getting OOB, PT Consult for mobility concerns, PT Consult for assist device competence, Strengthening exercises (ROM-active/passive), Utilize walker, cane, or other assistive device    Mentation Interventions: Adequate sleep, hydration, pain control, Bed/chair exit alarm, Door open when patient unattended, More frequent rounding, Reorient patient, Room close to nurse's station, Toileting rounds, Update white board    Medication Interventions: Teach patient to arise slowly, Patient to call before getting OOB, Bed/chair exit alarm    Elimination Interventions: Bed/chair exit alarm, Call light in reach, Patient to call for help with toileting needs, Toileting schedule/hourly rounds    History of Falls Interventions: Bed/chair exit alarm, Door open when patient unattended      11/24/2020 0049 by Maureen Shaw  Outcome: Progressing Towards Goal  Note: Fall Risk Interventions:  Mobility Interventions: Bed/chair exit alarm, Patient to call before getting OOB, PT Consult for mobility concerns, PT Consult for assist device competence, Strengthening exercises (ROM-active/passive), Utilize walker, cane, or other assistive device    Mentation Interventions: Adequate sleep, hydration, pain control, Bed/chair exit alarm, Door open when patient unattended, More frequent rounding, Reorient patient, Room close to nurse's station, Toileting rounds, Update white board    Medication Interventions: Teach patient to arise slowly, Patient to call before getting OOB, Bed/chair exit alarm    Elimination Interventions: Bed/chair exit alarm, Call light in reach, Patient to call for help with toileting needs, Toileting schedule/hourly rounds    History of Falls Interventions: Bed/chair exit alarm, Door open when patient unattended         Problem: Patient Education: Go to Patient Education Activity  Goal: Patient/Family Education  11/24/2020 0051 by Yaz Almonte  Outcome: Progressing Towards Goal  11/24/2020 0049 by Yaz Almonet  Outcome: Progressing Towards Goal     Problem: Pressure Injury - Risk of  Goal: *Prevention of pressure injury  Description: Document Timmy Scale and appropriate interventions in the flowsheet. 11/24/2020 0051 by Yaz Almonte  Outcome: Progressing Towards Goal  Note: Pressure Injury Interventions:  Sensory Interventions: Assess changes in LOC, Avoid rigorous massage over bony prominences, Check visual cues for pain, Float heels, Maintain/enhance activity level, Keep linens dry and wrinkle-free, Minimize linen layers, Monitor skin under medical devices, Pressure redistribution bed/mattress (bed type), Turn and reposition approx. every two hours (pillows and wedges if needed)    Moisture Interventions: Absorbent underpads, Apply protective barrier, creams and emollients, Check for incontinence Q2 hours and as needed, Maintain skin hydration (lotion/cream), Minimize layers    Activity Interventions: Increase time out of bed, Pressure redistribution bed/mattress(bed type), PT/OT evaluation    Mobility Interventions: Float heels, HOB 30 degrees or less, Pressure redistribution bed/mattress (bed type), PT/OT evaluation, Turn and reposition approx.  every two hours(pillow and wedges)    Nutrition Interventions: Document food/fluid/supplement intake                  11/24/2020 0049 by Yaz Almonte  Outcome: Progressing Towards Goal  Note: Pressure Injury Interventions:  Sensory Interventions: Assess changes in LOC, Avoid rigorous massage over bony prominences, Check visual cues for pain, Float heels, Maintain/enhance activity level, Keep linens dry and wrinkle-free, Minimize linen layers, Monitor skin under medical devices, Pressure redistribution bed/mattress (bed type), Turn and reposition approx. every two hours (pillows and wedges if needed)    Moisture Interventions: Absorbent underpads, Apply protective barrier, creams and emollients, Check for incontinence Q2 hours and as needed, Maintain skin hydration (lotion/cream), Minimize layers    Activity Interventions: Increase time out of bed, Pressure redistribution bed/mattress(bed type), PT/OT evaluation    Mobility Interventions: Float heels, HOB 30 degrees or less, Pressure redistribution bed/mattress (bed type), PT/OT evaluation, Turn and reposition approx.  every two hours(pillow and wedges)    Nutrition Interventions: Document food/fluid/supplement intake                     Problem: Patient Education: Go to Patient Education Activity  Goal: Patient/Family Education  11/24/2020 0051 by Corrina Payor  Outcome: Progressing Towards Goal  11/24/2020 0049 by Corrina Payor  Outcome: Progressing Towards Goal     Problem: Patient Education: Go to Patient Education Activity  Goal: Patient/Family Education  11/24/2020 0051 by Corrina Payor  Outcome: Progressing Towards Goal  11/24/2020 0049 by Corrina Payor  Outcome: Progressing Towards Goal

## 2020-11-24 NOTE — PROGRESS NOTES
TRANSFER - IN REPORT:    Verbal report received from [facility] on Molly Bañuelos being received from Cath Lab for routine progression of care. Report consisted of patients Situation, Background, Assessment and Recommendations(SBAR). Information from the following report(s) SBAR, Kardex, [facility] and Cardiac Rhythm PACED was reviewed. Opportunity for questions and clarification was provided. Assessment completed upon patients arrival to unit and care assumed. Patient received to room 2227. Patient connected to monitor and assessment completed. Plan of care reviewed. Patient oriented to room and call light. Patient aware to use call light to communicate any chest pain or needs. Admission skin assessment completed with second RN and reveals the following:  Single left subclavian site, with pressure dressing and aquacel dressing in sling. Alleyn pulled back. Sacrum pink and blanchable.

## 2020-12-02 PROBLEM — Z95.0 S/P BIVENTRICULAR CARDIAC PACEMAKER PROCEDURE: Chronic | Status: ACTIVE | Noted: 2020-12-02

## 2020-12-02 PROBLEM — I35.0 NONRHEUMATIC AORTIC VALVE STENOSIS: Chronic | Status: RESOLVED | Noted: 2020-09-11 | Resolved: 2020-12-02

## 2020-12-07 LAB
CA-I BLD-MCNC: 1.28 MMOL/L (ref 1.12–1.32)
CA-I BLD-MCNC: 1.29 MMOL/L (ref 1.12–1.32)
GLUCOSE BLD STRIP.AUTO-MCNC: 110 MG/DL (ref 65–100)
GLUCOSE BLD STRIP.AUTO-MCNC: 112 MG/DL (ref 65–100)
HCO3 BLD-SCNC: 26.9 MMOL/L (ref 22–26)
HCO3 BLD-SCNC: 28.2 MMOL/L (ref 22–26)
PCO2 BLD: 45.9 MMHG (ref 35–45)
PCO2 BLD: 51.5 MMHG (ref 35–45)
PH BLD: 7.35 [PH] (ref 7.35–7.45)
PH BLD: 7.38 [PH] (ref 7.35–7.45)
PO2 BLD: 266 MMHG (ref 75–100)
PO2 BLD: 400 MMHG (ref 75–100)
POTASSIUM BLD-SCNC: 4 MMOL/L (ref 3.5–5.1)
POTASSIUM BLD-SCNC: 4.1 MMOL/L (ref 3.5–5.1)
SAO2 % BLD: 100 % (ref 95–98)
SAO2 % BLD: 100 % (ref 95–98)
SODIUM BLD-SCNC: 139 MMOL/L (ref 136–145)
SODIUM BLD-SCNC: 139 MMOL/L (ref 136–145)

## 2025-01-15 NOTE — PROGRESS NOTES
[facility] CARDIOLOGY PROGRESS NOTE           11/24/2020 7:26 AM    Admit Date: 11/17/2020      Subjective:   Patient at baseline and stable post PM.      ROS:  Cardiovascular:  As noted above    Objective:      Vitals:    11/24/20 0327 11/24/20 0410 11/24/20 0519 11/24/20 0711   BP:    131/78   Pulse:    77   Resp:  14 16 17   Temp:    97.6 °F (36.4 °C)   SpO2:    95%   Weight: 200 lb 9.9 oz (91 kg)      Height:           Physical Exam:  General-No Acute Distress  Neck- supple, no JVD  CV- regular rate and rhythm no MRG  Lung- clear bilaterally  Abd- soft, nontender, nondistended  Ext- no edema bilaterally. 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 He is very debilitated at baseline and mild dementia. He is out of bed. No complaints and stable post BiV PM.  Medical therapy appropriate. He appears stable to DC home with HH/PT. Active Problems:    Coronary artery disease involving native coronary artery of native heart without angina pectoris (5/9/2016)    Stable angina and medical therapy appropriate. Orthostatic hypotension (6/13/2016)    This is stable       Pure hypercholesterolemia (6/27/2016)    On therapy       Chronic combined systolic and diastolic congestive heart failure ([address] (9/11/2020)    EF 45-50%.   Volume stable and medical therapy appropriate       LBBB (left bundle branch block) (11/20/2020)    S/P BiV PM    Stable to DC home with HH/PT          Angel Luis Katz MD  11/24/2020 7:26 AM Chronic cerebrovascular accident (CVA)

## (undated) DEVICE — CONNECTOR TBNG OD5-7MM O2 END DISP

## (undated) DEVICE — BLOCK BITE AD 60FR W/ VELC STRP ADDRESSES MOST PT AND

## (undated) DEVICE — DRAPE TWL SURG 16X26IN BLU ORB04] ALLCARE INC]

## (undated) DEVICE — ELECTRODE DEFIB CARD W/ LVP GEL MULTFUNC PRO-PADZ

## (undated) DEVICE — GOWN,REINF,POLY,ECL,PP SLV,XL: Brand: MEDLINE

## (undated) DEVICE — GUIDEWIRE VASC L260CM DIA0.035IN TAPR L11CM FLPY TIP L4CM

## (undated) DEVICE — REM POLYHESIVE ADULT PATIENT RETURN ELECTRODE: Brand: VALLEYLAB

## (undated) DEVICE — KENDALL RADIOLUCENT FOAM MONITORING ELECTRODE RECTANGULAR SHAPE: Brand: KENDALL

## (undated) DEVICE — COVER,TABLE,HEAVY DUTY,79"X110",STRL: Brand: MEDLINE

## (undated) DEVICE — CANNULA NSL ORAL AD FOR CAPNOFLEX CO2 O2 AIRLFE

## (undated) DEVICE — SUTURE PERMAHAND SZ 0 L30IN NONABSORBABLE BLK L30MM PSL 3/8 590H

## (undated) DEVICE — PREP SKN CHLRAPRP APL 26ML STR --